# Patient Record
Sex: FEMALE | Race: BLACK OR AFRICAN AMERICAN | NOT HISPANIC OR LATINO | URBAN - METROPOLITAN AREA
[De-identification: names, ages, dates, MRNs, and addresses within clinical notes are randomized per-mention and may not be internally consistent; named-entity substitution may affect disease eponyms.]

---

## 2022-06-10 ENCOUNTER — INPATIENT (INPATIENT)
Facility: HOSPITAL | Age: 47
LOS: 1 days | Discharge: HOME | DRG: 198 | End: 2022-06-12
Attending: FAMILY MEDICINE | Admitting: INTERNAL MEDICINE
Payer: MEDICAID

## 2022-06-10 VITALS — WEIGHT: 145.06 LBS | HEIGHT: 62 IN

## 2022-06-10 DIAGNOSIS — I20.0 UNSTABLE ANGINA: ICD-10-CM

## 2022-06-10 LAB
ALBUMIN SERPL ELPH-MCNC: 3.2 G/DL — LOW (ref 3.3–5)
ALP SERPL-CCNC: 42 U/L — SIGNIFICANT CHANGE UP (ref 40–120)
ALT FLD-CCNC: 18 U/L — SIGNIFICANT CHANGE UP (ref 12–78)
ANION GAP SERPL CALC-SCNC: 5 MMOL/L — SIGNIFICANT CHANGE UP (ref 5–17)
AST SERPL-CCNC: 18 U/L — SIGNIFICANT CHANGE UP (ref 15–37)
BASOPHILS # BLD AUTO: 0.02 K/UL — SIGNIFICANT CHANGE UP (ref 0–0.2)
BASOPHILS NFR BLD AUTO: 0.3 % — SIGNIFICANT CHANGE UP (ref 0–2)
BILIRUB SERPL-MCNC: 0.3 MG/DL — SIGNIFICANT CHANGE UP (ref 0.2–1.2)
BUN SERPL-MCNC: 24 MG/DL — HIGH (ref 7–23)
CALCIUM SERPL-MCNC: 9.1 MG/DL — SIGNIFICANT CHANGE UP (ref 8.5–10.1)
CHLORIDE SERPL-SCNC: 107 MMOL/L — SIGNIFICANT CHANGE UP (ref 96–108)
CO2 SERPL-SCNC: 24 MMOL/L — SIGNIFICANT CHANGE UP (ref 22–31)
CREAT SERPL-MCNC: 1.2 MG/DL — SIGNIFICANT CHANGE UP (ref 0.5–1.3)
EGFR: 56 ML/MIN/1.73M2 — LOW
EOSINOPHIL # BLD AUTO: 0.18 K/UL — SIGNIFICANT CHANGE UP (ref 0–0.5)
EOSINOPHIL NFR BLD AUTO: 2.5 % — SIGNIFICANT CHANGE UP (ref 0–6)
GLUCOSE SERPL-MCNC: 87 MG/DL — SIGNIFICANT CHANGE UP (ref 70–99)
HCT VFR BLD CALC: 34.1 % — LOW (ref 34.5–45)
HGB BLD-MCNC: 11 G/DL — LOW (ref 11.5–15.5)
IMM GRANULOCYTES NFR BLD AUTO: 0.1 % — SIGNIFICANT CHANGE UP (ref 0–1.5)
LYMPHOCYTES # BLD AUTO: 1.67 K/UL — SIGNIFICANT CHANGE UP (ref 1–3.3)
LYMPHOCYTES # BLD AUTO: 23 % — SIGNIFICANT CHANGE UP (ref 13–44)
MCHC RBC-ENTMCNC: 27.2 PG — SIGNIFICANT CHANGE UP (ref 27–34)
MCHC RBC-ENTMCNC: 32.3 GM/DL — SIGNIFICANT CHANGE UP (ref 32–36)
MCV RBC AUTO: 84.2 FL — SIGNIFICANT CHANGE UP (ref 80–100)
MONOCYTES # BLD AUTO: 0.67 K/UL — SIGNIFICANT CHANGE UP (ref 0–0.9)
MONOCYTES NFR BLD AUTO: 9.2 % — SIGNIFICANT CHANGE UP (ref 2–14)
NEUTROPHILS # BLD AUTO: 4.7 K/UL — SIGNIFICANT CHANGE UP (ref 1.8–7.4)
NEUTROPHILS NFR BLD AUTO: 64.9 % — SIGNIFICANT CHANGE UP (ref 43–77)
NT-PROBNP SERPL-SCNC: 620 PG/ML — HIGH (ref 0–125)
PLATELET # BLD AUTO: 371 K/UL — SIGNIFICANT CHANGE UP (ref 150–400)
POTASSIUM SERPL-MCNC: 4.3 MMOL/L — SIGNIFICANT CHANGE UP (ref 3.5–5.3)
POTASSIUM SERPL-SCNC: 4.3 MMOL/L — SIGNIFICANT CHANGE UP (ref 3.5–5.3)
PROT SERPL-MCNC: 7.8 GM/DL — SIGNIFICANT CHANGE UP (ref 6–8.3)
RBC # BLD: 4.05 M/UL — SIGNIFICANT CHANGE UP (ref 3.8–5.2)
RBC # FLD: 23.1 % — HIGH (ref 10.3–14.5)
SODIUM SERPL-SCNC: 136 MMOL/L — SIGNIFICANT CHANGE UP (ref 135–145)
TROPONIN I, HIGH SENSITIVITY RESULT: 8.77 NG/L — SIGNIFICANT CHANGE UP
TROPONIN I, HIGH SENSITIVITY RESULT: 8.92 NG/L — SIGNIFICANT CHANGE UP
TROPONIN I, HIGH SENSITIVITY RESULT: 8.96 NG/L — SIGNIFICANT CHANGE UP
WBC # BLD: 7.25 K/UL — SIGNIFICANT CHANGE UP (ref 3.8–10.5)
WBC # FLD AUTO: 7.25 K/UL — SIGNIFICANT CHANGE UP (ref 3.8–10.5)

## 2022-06-10 PROCEDURE — 84443 ASSAY THYROID STIM HORMONE: CPT

## 2022-06-10 PROCEDURE — 82607 VITAMIN B-12: CPT

## 2022-06-10 PROCEDURE — 83735 ASSAY OF MAGNESIUM: CPT

## 2022-06-10 PROCEDURE — 85652 RBC SED RATE AUTOMATED: CPT

## 2022-06-10 PROCEDURE — 80061 LIPID PANEL: CPT

## 2022-06-10 PROCEDURE — 83036 HEMOGLOBIN GLYCOSYLATED A1C: CPT

## 2022-06-10 PROCEDURE — 93306 TTE W/DOPPLER COMPLETE: CPT

## 2022-06-10 PROCEDURE — 84484 ASSAY OF TROPONIN QUANT: CPT

## 2022-06-10 PROCEDURE — 93926 LOWER EXTREMITY STUDY: CPT | Mod: 26,LT

## 2022-06-10 PROCEDURE — 82746 ASSAY OF FOLIC ACID SERUM: CPT

## 2022-06-10 PROCEDURE — 71045 X-RAY EXAM CHEST 1 VIEW: CPT | Mod: 26

## 2022-06-10 PROCEDURE — 99223 1ST HOSP IP/OBS HIGH 75: CPT

## 2022-06-10 PROCEDURE — 82550 ASSAY OF CK (CPK): CPT

## 2022-06-10 PROCEDURE — 81001 URINALYSIS AUTO W/SCOPE: CPT

## 2022-06-10 PROCEDURE — 93306 TTE W/DOPPLER COMPLETE: CPT | Mod: 26

## 2022-06-10 PROCEDURE — 86140 C-REACTIVE PROTEIN: CPT

## 2022-06-10 PROCEDURE — 99285 EMERGENCY DEPT VISIT HI MDM: CPT

## 2022-06-10 PROCEDURE — 93926 LOWER EXTREMITY STUDY: CPT | Mod: LT

## 2022-06-10 PROCEDURE — 84100 ASSAY OF PHOSPHORUS: CPT

## 2022-06-10 PROCEDURE — 36415 COLL VENOUS BLD VENIPUNCTURE: CPT

## 2022-06-10 PROCEDURE — 82306 VITAMIN D 25 HYDROXY: CPT

## 2022-06-10 PROCEDURE — 85025 COMPLETE CBC W/AUTO DIFF WBC: CPT

## 2022-06-10 PROCEDURE — 80053 COMPREHEN METABOLIC PANEL: CPT

## 2022-06-10 PROCEDURE — 93010 ELECTROCARDIOGRAM REPORT: CPT

## 2022-06-10 RX ORDER — BUPROPION HYDROCHLORIDE 150 MG/1
1 TABLET, EXTENDED RELEASE ORAL
Qty: 0 | Refills: 0 | DISCHARGE

## 2022-06-10 RX ORDER — ATORVASTATIN CALCIUM 80 MG/1
40 TABLET, FILM COATED ORAL AT BEDTIME
Refills: 0 | Status: DISCONTINUED | OUTPATIENT
Start: 2022-06-10 | End: 2022-06-12

## 2022-06-10 RX ORDER — ATORVASTATIN CALCIUM 80 MG/1
1 TABLET, FILM COATED ORAL
Qty: 0 | Refills: 0 | DISCHARGE

## 2022-06-10 RX ORDER — TRIAMTERENE/HYDROCHLOROTHIAZID 75 MG-50MG
1 TABLET ORAL
Qty: 0 | Refills: 0 | DISCHARGE

## 2022-06-10 RX ORDER — ALBUTEROL 90 UG/1
2 AEROSOL, METERED ORAL
Qty: 0 | Refills: 0 | DISCHARGE

## 2022-06-10 RX ORDER — PRASUGREL 5 MG/1
1 TABLET, FILM COATED ORAL
Qty: 0 | Refills: 0 | DISCHARGE

## 2022-06-10 RX ORDER — METOPROLOL TARTRATE 50 MG
1 TABLET ORAL
Qty: 0 | Refills: 0 | DISCHARGE

## 2022-06-10 RX ORDER — ACETAMINOPHEN 500 MG
1000 TABLET ORAL ONCE
Refills: 0 | Status: COMPLETED | OUTPATIENT
Start: 2022-06-10 | End: 2022-06-10

## 2022-06-10 RX ORDER — METOPROLOL TARTRATE 50 MG
25 TABLET ORAL
Refills: 0 | Status: DISCONTINUED | OUTPATIENT
Start: 2022-06-10 | End: 2022-06-12

## 2022-06-10 RX ORDER — FUROSEMIDE 40 MG
20 TABLET ORAL DAILY
Refills: 0 | Status: DISCONTINUED | OUTPATIENT
Start: 2022-06-10 | End: 2022-06-12

## 2022-06-10 RX ORDER — PRASUGREL 5 MG/1
10 TABLET, FILM COATED ORAL DAILY
Refills: 0 | Status: DISCONTINUED | OUTPATIENT
Start: 2022-06-11 | End: 2022-06-12

## 2022-06-10 RX ORDER — CITALOPRAM 10 MG/1
1 TABLET, FILM COATED ORAL
Qty: 0 | Refills: 0 | DISCHARGE

## 2022-06-10 RX ORDER — CITALOPRAM 10 MG/1
20 TABLET, FILM COATED ORAL DAILY
Refills: 0 | Status: DISCONTINUED | OUTPATIENT
Start: 2022-06-10 | End: 2022-06-12

## 2022-06-10 RX ORDER — DOXEPIN HCL 100 MG
50 CAPSULE ORAL AT BEDTIME
Refills: 0 | Status: DISCONTINUED | OUTPATIENT
Start: 2022-06-10 | End: 2022-06-12

## 2022-06-10 RX ORDER — ASPIRIN/CALCIUM CARB/MAGNESIUM 324 MG
81 TABLET ORAL DAILY
Refills: 0 | Status: DISCONTINUED | OUTPATIENT
Start: 2022-06-10 | End: 2022-06-12

## 2022-06-10 RX ORDER — BUPROPION HYDROCHLORIDE 150 MG/1
150 TABLET, EXTENDED RELEASE ORAL DAILY
Refills: 0 | Status: DISCONTINUED | OUTPATIENT
Start: 2022-06-10 | End: 2022-06-12

## 2022-06-10 RX ORDER — TRIAMTERENE/HYDROCHLOROTHIAZID 75 MG-50MG
1 TABLET ORAL DAILY
Refills: 0 | Status: DISCONTINUED | OUTPATIENT
Start: 2022-06-10 | End: 2022-06-12

## 2022-06-10 RX ORDER — ACETAMINOPHEN 500 MG
650 TABLET ORAL EVERY 6 HOURS
Refills: 0 | Status: DISCONTINUED | OUTPATIENT
Start: 2022-06-10 | End: 2022-06-12

## 2022-06-10 RX ORDER — ALBUTEROL 90 UG/1
2 AEROSOL, METERED ORAL EVERY 6 HOURS
Refills: 0 | Status: DISCONTINUED | OUTPATIENT
Start: 2022-06-10 | End: 2022-06-12

## 2022-06-10 RX ORDER — KETOROLAC TROMETHAMINE 30 MG/ML
30 SYRINGE (ML) INJECTION ONCE
Refills: 0 | Status: DISCONTINUED | OUTPATIENT
Start: 2022-06-10 | End: 2022-06-10

## 2022-06-10 RX ORDER — ASPIRIN/CALCIUM CARB/MAGNESIUM 324 MG
1 TABLET ORAL
Qty: 0 | Refills: 0 | DISCHARGE

## 2022-06-10 RX ORDER — NICOTINE POLACRILEX 2 MG
1 GUM BUCCAL DAILY
Refills: 0 | Status: DISCONTINUED | OUTPATIENT
Start: 2022-06-10 | End: 2022-06-12

## 2022-06-10 RX ORDER — DOXEPIN HCL 100 MG
1 CAPSULE ORAL
Qty: 0 | Refills: 0 | DISCHARGE

## 2022-06-10 RX ADMIN — Medication 20 MILLIGRAM(S): at 21:44

## 2022-06-10 RX ADMIN — Medication 400 MILLIGRAM(S): at 14:46

## 2022-06-10 RX ADMIN — Medication 1 PATCH: at 21:42

## 2022-06-10 RX ADMIN — Medication 30 MILLIGRAM(S): at 14:57

## 2022-06-10 RX ADMIN — Medication 50 MILLIGRAM(S): at 21:43

## 2022-06-10 RX ADMIN — ATORVASTATIN CALCIUM 40 MILLIGRAM(S): 80 TABLET, FILM COATED ORAL at 21:44

## 2022-06-10 RX ADMIN — BUPROPION HYDROCHLORIDE 150 MILLIGRAM(S): 150 TABLET, EXTENDED RELEASE ORAL at 21:43

## 2022-06-10 NOTE — ED PROVIDER NOTE - PROGRESS NOTE DETAILS
CHARLETTE Gaytan MD:  case d/w Dr. KENNY Rivers: advised TTE to be done & advises cath lab eval.  Cath lab informed, NP to come down to evaluate pt. CHARLETTE Gaytan MD:  Cath lab NP at bedside. CHARLETTE Gaytan MD:  Unable to enter CXR wet read: AYLEEN.  Paging cardio on call. CHARLETTE Gaytan MD:  Pt at [facility].  No furhter word from Cath lab re: further management.  Dr. JO dias aware of tele admission. CHARLETTE Gaytan MD:  Pt at [facility].  No further word from Cath lab re: further management.  Dr. JO dias aware of tele admission.

## 2022-06-10 NOTE — ED PROVIDER NOTE - GASTROINTESTINAL NEGATIVE STATEMENT, MLM
no abdominal pain, no bloating, no constipation, no diarrhea, no nausea and no vomiting.  + L groin painful swelling s/p prior cath

## 2022-06-10 NOTE — PHARMACOTHERAPY INTERVENTION NOTE - COMMENTS
Medication history complete, reviewed medication with patient and list provided by patient and confirmed with DrFirst.  Patients list states Metoprolol Tartrate but patient is actually taking Metoprolol Succinate (pt has bottle with her)

## 2022-06-10 NOTE — H&P ADULT - ASSESSMENT
47 year old female w hx CAD s/p 5 stents 1 month ago, hx gastric bypass, HTN presented to ED c/o chest pain    #Chest pain poss ACS  #CAD s/p 5 stents 4 weeks ago  1. admit to telemetry  2. serial trop  3. cardiology consulted  4. ECHO pending  5. continue home meds  ASA 81 mg  prasugel 10 mg\atorvastatin 40 mg  metoprolol 25 mg BID w parameters      #mildly elevated BNP 600s  1. daily weights  2. continue lasix 20 mg qd  3. EF pending      #L groin pain  due to hematoma from prior cath  1. warm compress  2. ambulas tolerated      #L foot paresthesia  s/p cath  1. ambulate w cane        #PSYCH   anxiety and depression  1. continue wellbutrin  2. doxepin         #MISC  1. SW re shelter placement on discharge    #HTN  1. beta blocker as above  2. triamterine/HCTZ  3. lasix      #Hx gastric bypass  stable      #Headache  1. 1. acetaminophen 1000 mg IV 47 year old female w hx CAD s/p 5 stents 1 month ago, hx gastric bypass, HTN presented to ED c/o chest pain    #Chest pain poss ACS  #CAD s/p 5 stents 4 weeks ago  1. admit to telemetry  2. serial trop  3. cardiology consulted  4. ECHO pending  5. continue home meds  ASA 81 mg  prasugel 10 mg\atorvastatin 40 mg  metoprolol 25 mg BID w parameters      #mildly elevated BNP 600s  1. daily weights  2. continue lasix 20 mg qd  3. EF pending      #L groin pain  due to hematoma from prior cath  1. warm compress  2. ambulas tolerated      #L foot paresthesia  s/p cath  1. ambulate w cane      #HTN  1. beta blocker as above  2. triamterine/HCTZ  3. lasix      #Hx gastric bypass  stable      #Headache  1. 1. acetaminophen 1000 mg IV        #PSYCH   anxiety and depression  1. continue wellbutrin  2. doxepin 50 mg q HS for insomnia        #Tobacco use  has cut down over past month from 1 ppd to 1-2 cig qd  1. nicotine patch    #MISC  1.  re shelter placement on discharge      #VTE  low risk

## 2022-06-10 NOTE — ED ADULT NURSE NOTE - OBJECTIVE STATEMENT
Pt reports that she is experiencing intermittent episodes of pain with no discernable trigger. Pt reports pain does worsen with movement.  Pt denies SOB.  Pt reports recent MI with multiple stents placed. Pt reports continued fatigue since MI.

## 2022-06-10 NOTE — PHARMACOTHERAPY INTERVENTION NOTE - COMMENTS
Medication history complete, reviewed medication with list provided by patient and confirmed with patient.

## 2022-06-10 NOTE — CONSULT NOTE ADULT - SUBJECTIVE AND OBJECTIVE BOX
Patient is a 47y old  Female who presents with a chief complaint of     HPI:      PAST MEDICAL & SURGICAL HISTORY:  CAD (coronary artery disease)          PREVIOUS CARDIAC WORKUP:      Echo:  Stress Test:  Cardiac Cath:    ALLERGIES:    penicillin (Unknown)       MEDICATIONS  (STANDING):    MEDICATIONS  (PRN):      FAMILY HISTORY:        SOCIAL HISTORY:  .scl     ROS:     .ros    Vital Signs Last 24 Hrs  T(C): 36.8 (10 Kang 2022 10:44), Max: 37.2 (10 Kang 2022 08:39)  T(F): 98.3 (10 Kang 2022 10:44), Max: 98.9 (10 Kang 2022 08:39)  HR: 83 (10 Kang 2022 10:44) (83 - 90)  BP: 107/73 (10 Kang 2022 10:44) (102/68 - 107/73)  BP(mean): 80 (10 Kang 2022 10:44) (78 - 80)  RR: 18 (10 Kang 2022 10:44) (18 - 18)  SpO2: 99% (10 Kang 2022 10:44) (98% - 99%)    I&O's Summary      PHYSICAL EXAM:    .phy      TELEMETRY:    ECG:    LABS:                          11.0   7.25  )-----------( 371      ( 10 Kang 2022 09:03 )             34.1     06-10    136  |  107  |  24<H>  ----------------------------<  87  4.3   |  24  |  1.20    Ca    9.1      10 Kang 2022 09:03    TPro  7.8  /  Alb  3.2<L>  /  TBili  0.3  /  DBili  x   /  AST  18  /  ALT  18  /  AlkPhos  42  06-10        Pro BNP  620 06-10 @ 09:03  D Dimer  -- 06-10 @ 09:03              RADIOLOGY & ADDITIONAL STUDIES:     Patient is a 47y old  Female who presents with a chief complaint of     HPI: 46 y/o  AA F with PMHX of MI 4 weeks with 5 stents c/b left groin hematoma. Presented to ED c/o CP radiating to neck x 2 days. Reports pain is located under left breast and mid sternum, relief of pain when lays flat or back. Patient noticed discomfort when performing ADLs. C/o left groin pain and limited mobility r/t hematoma. Patient reporting she received 2 units of blood after her stents. Patient recently relocated to [place] from [place] with 2 out of 3 of her children. Complaint with ASA and Prasugrel and metoprolol. Current smoker; cutting back since recent MI        PAST MEDICAL & SURGICAL HISTORY:  CAD (coronary artery disease)      PREVIOUS CARDIAC WORKUP:      Echo:  Stress Test:  Cardiac Cath: May 2022- [facility]     ALLERGIES:    penicillin (Unknown)       MEDICATIONS  (STANDING):    MEDICATIONS  (PRN):    FAMILY HISTORY: grandfather with CV disease       REVIEW OF SYSTEMS:    CONSTITUTIONAL: No fever, weight loss, chills, shakes, or fatigue  EYES: No eye pain, visual disturbances, or discharge  ENMT:  No difficulty hearing, tinnitus, vertigo; No sinus or throat pain  NECK: No pain or stiffness  RESPIRATORY: No cough, wheezing, hemoptysis, or shortness of breath  CARDIOVASCULAR: +  chest pain, dyspnea, palpitations, + left groin hematoma,  no dizziness, syncope, paroxysmal nocturnal dyspnea, orthopnea, or arm or leg swelling  GASTROINTESTINAL: No abdominal  or epigastric pain, nausea, vomiting, hematemesis, diarrhea, constipation, melena or bright red blood.  GENITOURINARY: No dysuria, nocturia, hematuria, or urinary incontinence  NEUROLOGICAL: No headaches, memory loss, slurred speech, limb weakness, loss of strength, numbness, or tremors  SKIN: No itching, burning, rashes, or lesions   MUSCULOSKELETAL: No joint pain or swelling, muscle, back, or extremity pain  PSYCHIATRIC: No depression, anxiety, or difficulty sleeping    Vital Signs Last 24 Hrs  T(C): 36.8 (10 Kang 2022 10:44), Max: 37.2 (10 Kang 2022 08:39)  T(F): 98.3 (10 Kang 2022 10:44), Max: 98.9 (10 Kang 2022 08:39)  HR: 83 (10 Kang 2022 10:44) (83 - 90)  BP: 107/73 (10 Kang 2022 10:44) (102/68 - 107/73)  BP(mean): 80 (10 Kang 2022 10:44) (78 - 80)  RR: 18 (10 Kang 2022 10:44) (18 - 18)  SpO2: 99% (10 Kang 2022 10:44) (98% - 99%)      PHYSICAL EXAM  GENERAL: NAD, AAOx3  CHEST/LUNG: Clear to auscultation bilaterally; No wheeze  HEART: s1 s2 Regular rate and rhythm; No murmurs, rubs, or gallops  ABDOMEN: Soft, Nontender, Nondistended; Bowel sounds present X 4 quadrants   EXTREMITIES:  2+ Peripheral Pulses, No clubbing, cyanosis, or edema  SKIN: No rashes or lesions,  b/l LE not red, cool to touch,  no open skin no drainage- left groin hematoma, soft skin not taut   NEURO: nonfocal CN/motor/sensory/reflexes  Psych: normal affect and behavior, calm and cooperative       TELEMETRY: SR on monitor     ECG:   · EKG Date/Time: 10-Kang-2022 08:31  · Rate: 93  · Interpretation: normal sinus rhythm  · Axis: Normal  · QRS: PRWP V1 & V2  · ST/T Wave: TW flat II & F  · Other Findings: no ectopy      LABS:                          11.0   7.25  )-----------( 371      ( 10 Kang 2022 09:03 )             34.1     06-10    136  |  107  |  24<H>  ----------------------------<  87  4.3   |  24  |  1.20    Ca    9.1      10 Kang 2022 09:03    TPro  7.8  /  Alb  3.2<L>  /  TBili  0.3  /  DBili  x   /  AST  18  /  ALT  18  /  AlkPhos  42  06-10      Pro BNP  620 06-10 @ 09:03  D Dimer  -- 06-10 @ 09:03    Troponin I, High Sensitivity Result: 8.77:       RADIOLOGY & ADDITIONAL STUDIES:    < from: US Duplex Arterial Lower Ext Ltd, Left (06.10.22 @ 11:09) >    IMPRESSION: No pseudoaneurysm visualized on this exam. Complex left groin   collection measuring 6.3 cm,likely hematoma. Clinical correlation and   follow-up is recommended.    < end of copied text >    < from: Xray Chest 1 View- PORTABLE-Urgent (06.10.22 @ 10:35) >  IMPRESSION: Clear lungs.    < end of copied text >    ECHO pending     47-year-old female admitted with complaints of chest discomfort for the last 2 days.  Pain is positional, worse with leaning forward and improves with lying back.  Radiating to the neck.  Nonexertional.  History of recent MI about 4  weeks ago with 5 stents placed.  Compliant to DAPT. Procedure was performed in [place].  Procedure complicated with left groin hematoma.  Initial access obtained through the right wrist, right groin were unsuccessful and left femoral approach was used.  Postprocedure patient had a large hematoma that was compressed.  She received 2 units of packed red blood cell transfusion.  Since procedure she has been having complaints of left groin pain and difficulty ambulating.          PAST MEDICAL & SURGICAL HISTORY:  CAD (coronary artery disease)  MI, PCI of unknown coronary artery      PREVIOUS CARDIAC WORKUP:    Cardiac Cath: May 2022- [facility]     ALLERGIES:    penicillin (Unknown)     Home Medications:   * Patient Currently Takes Medications as of 10-Kang-2022 12:25 documented in Structured Notes  · 	Aspir 81 oral delayed release tablet: Last Dose Taken: 10-Kang-2022 AM, 1 tab(s) orally once a day  · 	prasugrel 10 mg oral tablet: Last Dose Taken:  , 1 tab(s) orally once a day  · 	citalopram 20 mg oral tablet: Last Dose Taken:  , 1 tab(s) orally once a day  · 	Wellbutrin  mg/24 hours oral tablet, extended release: Last Dose Taken:  , 1 tab(s) orally every 24 hours  · 	furosemide 20 mg oral tablet: Last Dose Taken:  , 1 tab(s) orally once a day  · 	doxepin 50 mg oral capsule: Last Dose Taken:  , 1 cap(s) orally once a day (at bedtime)  · 	Albuterol (Eqv-ProAir HFA) 90 mcg/inh inhalation aerosol: Last Dose Taken:  , 2 puff(s) inhaled every 4 hours, As Needed - for shortness of breath and/or wheezing  · 	atorvastatin 40 mg oral tablet: Last Dose Taken:  , 1 tab(s) orally once a day (at bedtime)  · 	metoprolol succinate 25 mg oral tablet, extended release: Last Dose Taken:  , 1 tab(s) orally 2 times a day  · 	triamterene-hydrochlorothiazide 37.5 mg-25 mg oral tablet: 1 tab(s) orally once a day    FAMILY HISTORY: grandfather with CV disease       REVIEW OF SYSTEMS:    CONSTITUTIONAL: No fever, weight loss, chills, shakes, or fatigue  EYES: No eye pain, visual disturbances, or discharge  ENMT:  No difficulty hearing, tinnitus, vertigo; No sinus or throat pain  NECK: No pain or stiffness  RESPIRATORY: No cough, wheezing, hemoptysis, or shortness of breath  CARDIOVASCULAR: +  chest pain, dyspnea, palpitations, + left groin hematoma,  no dizziness, syncope, paroxysmal nocturnal dyspnea, orthopnea, or arm or leg swelling  GASTROINTESTINAL: No abdominal  or epigastric pain, nausea, vomiting, hematemesis, diarrhea, constipation, melena or bright red blood.  GENITOURINARY: No dysuria, nocturia, hematuria, or urinary incontinence  NEUROLOGICAL: No headaches, memory loss, slurred speech, limb weakness, loss of strength, numbness, or tremors  SKIN: No itching, burning, rashes, or lesions   MUSCULOSKELETAL: No joint pain or swelling, muscle, back, or extremity pain  PSYCHIATRIC: No depression, anxiety, or difficulty sleeping      Vital Signs Last 24 Hrs  T(C): 36.8 (10 Kang 2022 10:44), Max: 37.2 (10 Kang 2022 08:39)  T(F): 98.3 (10 Kang 2022 10:44), Max: 98.9 (10 Kang 2022 08:39)  HR: 83 (10 Kang 2022 10:44) (83 - 90)  BP: 107/73 (10 Kang 2022 10:44) (102/68 - 107/73)  BP(mean): 80 (10 Kang 2022 10:44) (78 - 80)  RR: 18 (10 Kang 2022 10:44) (18 - 18)  SpO2: 99% (10 Kang 2022 10:44) (98% - 99%)      PHYSICAL EXAM  GENERAL: NAD, AAOx3  CHEST/LUNG: Clear to auscultation bilaterally; No wheeze  HEART: s1 s2 Regular rate and rhythm; No murmurs, rubs, or gallops  ABDOMEN: Soft, Nontender, Nondistended; Bowel sounds present X 4 quadrants   EXTREMITIES:  2+ Peripheral Pulses, No clubbing, cyanosis, or edema  SKIN: No rashes or lesions,  b/l LE not red, cool to touch,  no open skin no drainage- left groin hematoma, soft skin not taut   NEURO: nonfocal CN/motor/sensory/reflexes  Psych: normal affect and behavior, calm and cooperative       TELEMETRY: SR on monitor     ECG:   · EKG Date/Time: 10-Kang-2022 08:31  · Rate: 93  · Interpretation: normal sinus rhythm  · Axis: Normal  · QRS: PRWP V1 & V2  · ST/T Wave: TW flat II & F  · Other Findings: no ectopy      LABS:                          11.0   7.25  )-----------( 371      ( 10 Kang 2022 09:03 )             34.1     06-10    136  |  107  |  24<H>  ----------------------------<  87  4.3   |  24  |  1.20    Ca    9.1      10 Kang 2022 09:03    TPro  7.8  /  Alb  3.2<L>  /  TBili  0.3  /  DBili  x   /  AST  18  /  ALT  18  /  AlkPhos  42  06-10      Pro BNP  620 06-10 @ 09:03      Troponin I, High Sensitivity Result: 8.77:       RADIOLOGY & ADDITIONAL STUDIES:    < from: US Duplex Arterial Lower Ext Ltd, Left (06.10.22 @ 11:09) >    IMPRESSION: No pseudoaneurysm visualized on this exam. Complex left groin   collection measuring 6.3 cm, likely hematoma. Clinical correlation and   follow-up is recommended.      < from: Xray Chest 1 View- PORTABLE-Urgent (06.10.22 @ 10:35) >  IMPRESSION: Clear lungs.      ECHO pending

## 2022-06-10 NOTE — H&P ADULT - HISTORY OF PRESENT ILLNESS
47 year old female w hx CAD s/p 5 stents 1 month ago, hx gastric bypass presented to ED c/o chest pain          PAST MEDICAL HX  CAD coronary artery disease w stents x 5  Gastric bypass  HTN hypertension      PAST SURGICAL HX  Cardiac cath   Gastric bypass 2017      FAMILY HX  +CAD both grandparents  denied premature CAD  +HTN both grandparents        SOCIAL HX  Smoker  had smoked 1 ppd but since cath 1-2 cig qd  Will need shelter for her and her 2 children ages 15 and 9 47 year old female w hx CAD s/p 5 stents 1 month ago, hx gastric bypass, HTN presented to ED c/o chest pain    SInce her cath 4 weeks ago in [place] she has not felt well  c/o fatigue  + L groin pain w numbness to L foot after cath, now walking w cane  + SSCP w radiation to L neck intermittent x 2 days  Occurs w minimal exertion 6-7/10  Associated w +palpitations                      +diaphoresis "clammy"                      + nausea                      +SOB  not sure what makes pain better  activity makes it worse    Also c/o posterior headache, not sure if it is stress related    Took all meds this AM  NPO since early today    In ED /68   HR 90   RR 18   T 98.9  98% sat RA  trop neg x 1  EKG NSR no acute ST-T changes  cardiology consulted  echo performed  doppler L groin + hematoma          PAST MEDICAL HX  CAD coronary artery disease w stents x 5  Gastric bypass  HTN hypertension      PAST SURGICAL HX  Cardiac cath   Gastric bypass 2017      FAMILY HX  +CAD both grandparents  denied premature CAD  +HTN both grandparents        SOCIAL HX  Smoker  had smoked 1 ppd but since cath 1-2 cig qd  Will need shelter for her and her 2 children ages 15 and 9 47 year old female w hx CAD s/p 5 stents 1 month ago, hx gastric bypass, HTN presented to ED c/o chest pain    SInce her cath 4 weeks ago in [place] she has not felt well  c/o fatigue  + L groin pain w numbness to L foot after cath, now walking w cane  + SSCP w radiation to L neck intermittent x 2 days  Occurs w minimal exertion 6-7/10  Associated w +palpitations                      +diaphoresis "clammy"                      + nausea                      +SOB  not sure what makes pain better  activity makes it worse    Also c/o posterior headache, not sure if it is stress related    Took all meds this AM  NPO since early today    In ED /68   HR 90   RR 18   T 98.9  98% sat RA  trop neg x 1  EKG NSR no acute ST-T changes  cardiology consulted  echo performed  doppler L groin + hematoma          PAST MEDICAL HX  CAD coronary artery disease w stents x 5  Gastric bypass  HTN hypertension  HX transfusion after cath      PAST SURGICAL HX  Cardiac cath   Gastric bypass 2017      FAMILY HX  +CAD both grandparents  denied premature CAD  +HTN both grandparents        SOCIAL HX  Smoker  had smoked 1 ppd but since cath 1-2 cig qd  Will need shelter for her and her 2 children ages 15 and 9

## 2022-06-10 NOTE — CONSULT NOTE ADULT - ASSESSMENT
EKG and CE negative.   Positional chest pain - pleuritic  Right groin hematoma - s/p recent cardiac cath. EKG and CE negative.   Positional chest pain - pleuritic; toradol 30mg IVP x 1  ECHO pending   Left groin hematoma - s/p recent cardiac cath.   no urgent need for cardiac catheterization. Patient with no EKG changes, and negative troponin  will try to obtain records from [facility] Chest pain.  Pleuritic in nature.  Positional increase with leaning forward and relief with laying back.  Likely post infarct or post cardiac injury pericarditis.  CAD, recent infarct and coronary stents 5 weeks ago.  Left groin hematoma after recent cardiac cath.  No clinical evidence of pseudoaneurysm.  No vascular compromise.  No neuro compromise.  Mild paresthesia in the left leg.  Walking limited because of left groin pain.  No neurological deficit.  Smoking.    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  Trial of nonsteroidal anti-inflammatory drugs.  Colchicine.    Follow-up cardiac enzymes.  Follow-up repeat EKG.  Echo to assess LV function, pericardium  Obtain records from [place].  Multiple vascular access.  Cardiac catheterization was first attempted from right radial, then right common femoral and then left common femoral artery.  Now has a hematoma.  For now conservative treatment for left groin hematoma.  Area is soft, compressible, no bruit.  Ultrasound is negative for pseudoaneurysm.  No vascular or neurological compromise noted.  Warm compresses advised.  Follow-up in 1 to 2 weeks.     Chest pain.  Pleuritic in nature.  Positional increase with leaning forward and relief with laying back.  Likely post infarct or post cardiac injury pericarditis.  CAD, recent infarct and coronary stents 5 weeks ago.  Left groin hematoma after recent cardiac cath.  No clinical evidence of pseudoaneurysm.  No vascular compromise.  No neuro compromise.  Mild paresthesia in the left leg.  Walking limited because of left groin pain.  No neurological deficit.  Smoking.    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  Trial of nonsteroidal anti-inflammatory drugs.  Colchicine.    Follow-up cardiac enzymes.  Follow-up repeat EKG.  Echo to assess LV function, pericardium  Obtain records from [place].  Multiple vascular access.  Cardiac catheterization was first attempted from right radial, then right common femoral and then left common femoral artery.  Now has a hematoma.  For now conservative treatment for left groin hematoma.  Area is soft, compressible, no bruit.  Ultrasound is negative for pseudoaneurysm.  No vascular or neurological compromise noted.  Warm compresses advised.  Follow-up in 1 to 2 weeks.  Unsure why on Lasix and Dyazide - will follow BP. Renal function and lytes are normal.

## 2022-06-10 NOTE — ED PROVIDER NOTE - GASTROINTESTINAL, MLM
Abdomen soft, non-tender, no guarding.  BS+.   L groin: + mild focal sts, nonpulsatile, + mildly TTP Abdomen soft, non-tender, no guarding.  BS+.   L groin: + moderate focal sts, nonpulsatile, + mildly TTP

## 2022-06-10 NOTE — ED PROVIDER NOTE - CLINICAL SUMMARY MEDICAL DECISION MAKING FREE TEXT BOX
46 yo AA F, s/p reported 5 cor. stents 1 mo. ago in [place] currently on ASA-81 & Prasugrel, metoprolol, ambulatory to ED c/o'ing 2 days intermittent chest pain.  L anterior chest pain, aching/pressure, mild - moderate severity, non-pleuritic, intermittent, + exacerbated by exertion, + radiating up into L ant. neck.  Associated palps., SOB, mild diaphoresis.  Plan: EKG, CXR, cardiac labs, d/w cardio/cath Lab, monitor/observe/reassess. 46 yo AA F, s/p reported 5 cor. stents 1 mo. ago in [facility]. currently on ASA-81 & Prasugrel, metoprolol, ambulatory to ED c/o'ing 2 days intermittent chest pain.  L anterior chest pain, aching/pressure, mild - moderate severity, non-pleuritic, intermittent, + exacerbated by exertion & leaning forwards, + radiating up into L ant. neck.  Associated palps., SOB, mild diaphoresis.  Plan: EKG, CXR, cardiac labs, d/w cardio/cath Lab, monitor/observe/reassess.  Tele admission.

## 2022-06-10 NOTE — ED PROVIDER NOTE - NSICDXPASTMEDICALHX_GEN_ALL_CORE_FT
PAST MEDICAL HISTORY:  CAD (coronary artery disease)      PAST MEDICAL HISTORY:  CAD (coronary artery disease) asthma

## 2022-06-10 NOTE — ED PROVIDER NOTE - MUSCULOSKELETAL, MLM
Spine appears normal, range of motion is not limited, no muscle or joint tenderness.  CUADRA x 4, no focal extermity swelling/tender. Spine appears normal, range of motion is not limited, no muscle or joint tenderness.  CUADRA x 4, no focal extremity swelling/tender.

## 2022-06-10 NOTE — H&P ADULT - NSVTERISKREFERASSESS_GEN_ALL_CORE
Problem: Pressure Injury Actual  Goal: # No deterioration in pressure injury (PI)  Outcome: Outcome Not Met, Continue to Monitor  Goal: # Verbalizes pressure injury management  Description: Document education using the patient education activity.  Outcome: Outcome Not Met, Continue to Monitor     
Refer to the Assessment tab to view/cancel completed assessment.

## 2022-06-10 NOTE — ED ADULT TRIAGE NOTE - CHIEF COMPLAINT QUOTE
PT C/O LEFT SIDED CHEST RADIATING TO NECK, PT HX OF 5 CARDIAC STENTS X 1 MONTH.  REQUESTED EKG UPON ARRIVAL TO ED

## 2022-06-10 NOTE — ED PROVIDER NOTE - CONSTITUTIONAL, MLM
normal... AA F, awake, alert, oriented to person, place, time/situation, mildly ill-appearing.  No respiratory distress.

## 2022-06-10 NOTE — ED ADULT NURSE NOTE - NSIMPLEMENTINTERV_GEN_ALL_ED
2
Implemented All Fall with Harm Risk Interventions:  [place] to call system. Call bell, personal items and telephone within reach. Instruct patient to call for assistance. Room bathroom lighting operational. Non-slip footwear when patient is off stretcher. Physically safe environment: no spills, clutter or unnecessary equipment. Stretcher in lowest position, wheels locked, appropriate side rails in place. Provide visual cue, wrist band, yellow gown, etc. Monitor gait and stability. Monitor for mental status changes and reorient to person, place, and time. Review medications for side effects contributing to fall risk. Reinforce activity limits and safety measures with patient and family. Provide visual clues: red socks.

## 2022-06-10 NOTE — ED PROVIDER NOTE - OBJECTIVE STATEMENT
48 yo AA F, s/p reported 5 cor. stents 1 mo[place] ago in [place] currently on ASA-81 & Prasugrel, metoprolol, ambualtory to ED c/o'ing 2 days intermittent chest pain.  L anterior chest pain, aching/pressure, mild - moderate severity, non-pleuritic, intermittent, + exacerbated by exertion, + radiating up into L ant. neck.  Associated palps., SOB, mild diaphoresis.  No F/C.  Pt also c/o's persistent L groin aching discomfort with associated swelling since the cardiac cath. 46 yo AA F, s/p reported 5 cor. stents 1 mo[place] ago in [place] currently on ASA-81 & Prasugrel, metoprolol, ambulatory to ED c/o'ing 2 days intermittent chest pain.  L anterior chest pain, aching/pressure, mild - moderate severity, non-pleuritic, intermittent, + exacerbated by exertion & by leaning forwards, + radiating up into L ant. neck.  Associated palps., SOB, mild diaphoresis.  No F/C.  Pt also c/o's persistent L groin aching discomfort with associated swelling since the cardiac cath.

## 2022-06-10 NOTE — PATIENT PROFILE ADULT - FALL HARM RISK - HARM RISK INTERVENTIONS

## 2022-06-10 NOTE — ED ADULT NURSE REASSESSMENT NOTE - NS ED NURSE REASSESS COMMENT FT1
received at approximately 1020, alert and oriented x 4, no acute respiratory distress, respirations even and unlabored, transported to [facility] and returned to ER safely, grandmother at bedside, c/o left groin pain, medicated for pain as tolerated, tolerating PO fluids well.

## 2022-06-10 NOTE — H&P ADULT - NSHPPHYSICALEXAM_GEN_ALL_CORE
Vital Signs Last 24 Hrs  T(C): 36.8 (10 Kang 2022 10:44), Max: 37.2 (10 Kang 2022 08:39)  T(F): 98.3 (10 Kang 2022 10:44), Max: 98.9 (10 Kang 2022 08:39)  HR: 83 (10 Kang 2022 10:44) (83 - 90)  BP: 107/73 (10 Kang 2022 10:44) (102/68 - 107/73)  BP(mean): 80 (10 Kang 2022 10:44) (78 - 80)  RR: 18 (10 Kang 2022 10:44) (18 - 18)  SpO2: 99% (10 Kang 2022 10:44) (98% - 99%)

## 2022-06-11 LAB
A1C WITH ESTIMATED AVERAGE GLUCOSE RESULT: 5.4 % — SIGNIFICANT CHANGE UP (ref 4–5.6)
APPEARANCE UR: ABNORMAL
BILIRUB UR-MCNC: NEGATIVE — SIGNIFICANT CHANGE UP
CHOLEST SERPL-MCNC: 128 MG/DL — SIGNIFICANT CHANGE UP
COLOR SPEC: YELLOW — SIGNIFICANT CHANGE UP
DIFF PNL FLD: NEGATIVE — SIGNIFICANT CHANGE UP
ESTIMATED AVERAGE GLUCOSE: 108 MG/DL — SIGNIFICANT CHANGE UP (ref 68–114)
GLUCOSE UR QL: NEGATIVE — SIGNIFICANT CHANGE UP
HDLC SERPL-MCNC: 40 MG/DL — LOW
KETONES UR-MCNC: NEGATIVE — SIGNIFICANT CHANGE UP
LEUKOCYTE ESTERASE UR-ACNC: NEGATIVE — SIGNIFICANT CHANGE UP
LIPID PNL WITH DIRECT LDL SERPL: 70 MG/DL — SIGNIFICANT CHANGE UP
NITRITE UR-MCNC: NEGATIVE — SIGNIFICANT CHANGE UP
NON HDL CHOLESTEROL: 87 MG/DL — SIGNIFICANT CHANGE UP
PH UR: 6 — SIGNIFICANT CHANGE UP (ref 5–8)
PROT UR-MCNC: NEGATIVE — SIGNIFICANT CHANGE UP
SP GR SPEC: 1.01 — SIGNIFICANT CHANGE UP (ref 1.01–1.02)
TRIGL SERPL-MCNC: 88 MG/DL — SIGNIFICANT CHANGE UP
UROBILINOGEN FLD QL: NEGATIVE — SIGNIFICANT CHANGE UP
VIT B12 SERPL-MCNC: 786 PG/ML — SIGNIFICANT CHANGE UP (ref 232–1245)

## 2022-06-11 PROCEDURE — 99232 SBSQ HOSP IP/OBS MODERATE 35: CPT

## 2022-06-11 RX ORDER — COLCHICINE 0.6 MG
0.6 TABLET ORAL
Refills: 0 | Status: DISCONTINUED | OUTPATIENT
Start: 2022-06-11 | End: 2022-06-12

## 2022-06-11 RX ORDER — IBUPROFEN 200 MG
400 TABLET ORAL THREE TIMES A DAY
Refills: 0 | Status: DISCONTINUED | OUTPATIENT
Start: 2022-06-11 | End: 2022-06-12

## 2022-06-11 RX ADMIN — Medication 400 MILLIGRAM(S): at 15:00

## 2022-06-11 RX ADMIN — Medication 50 MILLIGRAM(S): at 21:09

## 2022-06-11 RX ADMIN — Medication 400 MILLIGRAM(S): at 21:08

## 2022-06-11 RX ADMIN — Medication 81 MILLIGRAM(S): at 09:39

## 2022-06-11 RX ADMIN — CITALOPRAM 20 MILLIGRAM(S): 10 TABLET, FILM COATED ORAL at 09:40

## 2022-06-11 RX ADMIN — BUPROPION HYDROCHLORIDE 150 MILLIGRAM(S): 150 TABLET, EXTENDED RELEASE ORAL at 09:39

## 2022-06-11 RX ADMIN — Medication 20 MILLIGRAM(S): at 09:41

## 2022-06-11 RX ADMIN — Medication 400 MILLIGRAM(S): at 14:34

## 2022-06-11 RX ADMIN — Medication 0.6 MILLIGRAM(S): at 21:09

## 2022-06-11 RX ADMIN — Medication 1 TABLET(S): at 09:40

## 2022-06-11 RX ADMIN — ATORVASTATIN CALCIUM 40 MILLIGRAM(S): 80 TABLET, FILM COATED ORAL at 21:11

## 2022-06-11 RX ADMIN — Medication 1 PATCH: at 09:40

## 2022-06-11 RX ADMIN — PRASUGREL 10 MILLIGRAM(S): 5 TABLET, FILM COATED ORAL at 09:40

## 2022-06-11 RX ADMIN — Medication 0.6 MILLIGRAM(S): at 11:30

## 2022-06-11 NOTE — PROGRESS NOTE ADULT - SUBJECTIVE AND OBJECTIVE BOX
CURRENT CARDIAC WORKUP:       Echo:  Stress Test:  Cardiac Cath:    Allergies:   penicillin (Unknown)      MEDICATIONS  (STANDING):  aspirin enteric coated 81 milliGRAM(s) Oral daily  atorvastatin 40 milliGRAM(s) Oral at bedtime  buPROPion XL (24-Hour) . 150 milliGRAM(s) Oral daily  citalopram 20 milliGRAM(s) Oral daily  doxepin 50 milliGRAM(s) Oral at bedtime  furosemide    Tablet 20 milliGRAM(s) Oral daily  metoprolol succinate ER 25 milliGRAM(s) Oral two times a day  nicotine -   7 mG/24Hr(s) Patch 1 patch Transdermal daily  prasugrel 10 milliGRAM(s) Oral daily  triamterene 37.5 mG/hydrochlorothiazide 25 mG Tablet 1 Tablet(s) Oral daily    MEDICATIONS  (PRN):  acetaminophen     Tablet .. 650 milliGRAM(s) Oral every 6 hours PRN Temp greater or equal to 38C (100.4F), Mild Pain (1 - 3)  ALBUTerol    90 MICROgram(s) HFA Inhaler 2 Puff(s) Inhalation every 6 hours PRN Shortness of Breath      ROS:     .ros      Vital Signs Last 24 Hrs  T(C): 36.8 (10 Kang 2022 16:08), Max: 37.2 (10 Kang 2022 08:39)  T(F): 98.3 (10 Kang 2022 16:08), Max: 98.9 (10 Kang 2022 08:39)  HR: 85 (10 Kang 2022 21:39) (79 - 90)  BP: 97/51 (10 Kang 2022 21:39) (96/56 - 107/73)  BP(mean): 63 (10 Kang 2022 14:54) (63 - 80)  RR: 19 (10 Kang 2022 14:54) (18 - 19)  SpO2: 100% (10 Kang 2022 21:39) (98% - 100%)    I&O's Summary      PHYSICAL EXAM:    .phy      TELEMETRY:    ECG:    LABS:                        11.0   7.25  )-----------( 371      ( 10 Kang 2022 09:03 )             34.1     06-10    136  |  107  |  24<H>  ----------------------------<  87  4.3   |  24  |  1.20    Ca    9.1      10 Kang 2022 09:03    TPro  7.8  /  Alb  3.2<L>  /  TBili  0.3  /  DBili  x   /  AST  18  /  ALT  18  /  AlkPhos  42  06-10      HS trop-I  Negative X 3      Pro BNP  620 06-10 @ 09:03        RADIOLOGY & ADDITIONAL STUDIES:    < from: Xray Chest 1 View- PORTABLE-Urgent (06.10.22 @ 10:35) >  IMPRESSION: Clear lungs.    < from: US Duplex Arterial Lower Ext Ltd, Left (06.10.22 @ 11:09) >  IMPRESSION: No pseudoaneurysm visualized on this exam. Complex left groin   collection measuring 6.3 cm,likely hematoma. Clinical correlation and   follow-up is recommended.   47-year-old female admitted with complaints of chest discomfort for the last 2 days.  Pain is positional, worse with leaning forward and improves with lying back.  Radiating to the neck.  Nonexertional.  History of recent MI about 4  weeks ago with 5 stents placed.  Compliant to DAPT. Procedure was performed in [place].  Procedure complicated with left groin hematoma.  Initial access obtained through the right wrist, right groin were unsuccessful and left femoral approach was used.  Postprocedure patient had a large hematoma that was compressed.  She received 2 units of packed red blood cell transfusion.  Since procedure she has been having complaints of left groin pain and difficulty ambulating.    Today, feels better with trial of NSAIDs.      PAST MEDICAL & SURGICAL HISTORY:  CAD (coronary artery disease)  MI, PCI of unknown coronary artery      CARDIAC WORKUP:    Cardiac Cath: May 2022- [facility]  < from: TTE Echo Complete w/o Contrast w/ Doppler (06.10.22 @ 12:06) >   The basal septal wall is akinetic.   The basal inferior wall is akinetic. All remaining segments demonstrate normal thickening and contractility.   Estimated left ventricular ejection fraction is 55 %.   The left ventricular is normal in size and wall thickness.   Normal appearing left atrium.   Normal appearing right atrium.   Normal appearing right ventricle structure and function.   Normal aortic valve structure and function.   The mitral valve leaflets appear thin and normal.   Trace mitral regurgitation is present.   The tricuspid valve leaflets are thin and pliable; valve motion is normal.   Trace tricuspid valve regurgitation is present.   No evidence of pericardial effusion.   The IVC appears normal.      Allergies:   penicillin (Unknown)      MEDICATIONS  (STANDING):  aspirin enteric coated 81 milliGRAM(s) Oral daily  atorvastatin 40 milliGRAM(s) Oral at bedtime  buPROPion XL (24-Hour) . 150 milliGRAM(s) Oral daily  citalopram 20 milliGRAM(s) Oral daily  doxepin 50 milliGRAM(s) Oral at bedtime  furosemide    Tablet 20 milliGRAM(s) Oral daily  metoprolol succinate ER 25 milliGRAM(s) Oral two times a day  nicotine -   7 mG/24Hr(s) Patch 1 patch Transdermal daily  prasugrel 10 milliGRAM(s) Oral daily  triamterene 37.5 mG/hydrochlorothiazide 25 mG Tablet 1 Tablet(s) Oral daily    MEDICATIONS  (PRN):  acetaminophen     Tablet .. 650 milliGRAM(s) Oral every 6 hours PRN Temp greater or equal to 38C (100.4F), Mild Pain (1 - 3)  ALBUTerol    90 MICROgram(s) HFA Inhaler 2 Puff(s) Inhalation every 6 hours PRN Shortness of Breath      ROS:     Detailed ten system ROS was performed and was negative except for history as eluded to above.    no fever  no chills  no nausea  no vomiting  no diarrhea  no constipation  no melena  no hematochezia  + chest pain, improving  no palpitations  no sob at rest  no dyspnea on exertion  no cough  no wheezing  no anorexia  no headache  no dizziness  no syncope  no weakness  no myalgia  no dysuria  no polyuria  no hematuria       Vital Signs Last 24 Hrs  T(C): 36.8 (10 Kang 2022 16:08), Max: 37.2 (10 Kang 2022 08:39)  T(F): 98.3 (10 Kang 2022 16:08), Max: 98.9 (10 Kang 2022 08:39)  HR: 85 (10 Kang 2022 21:39) (79 - 90)  BP: 97/51 (10 Kang 2022 21:39) (96/56 - 107/73)  BP(mean): 63 (10 Kang 2022 14:54) (63 - 80)  RR: 19 (10 Kang 2022 14:54) (18 - 19)  SpO2: 100% (10 Kang 2022 21:39) (98% - 100%)      PHYSICAL EXAM:    General:                Comfortable, AAO X 3, in no distress.   HEENT:                  Atraumatic, PERRLA, EOMI, conjunctiva clear.   Neck:                     Supple, no adenopathy, no thyromegaly, no JVD, no bruit.  Chest:                    Clear, B/L symmetric air entry, no tachypnea  Heart:                     S1, S2 normal, + murmur, +rub  Abdomen:              Soft, non-tender, bowel sounds active. No palpable masses.  Extremities:           no cyanosis, no edema. Peripheral pulses normal. Left groin hematoma (6cm X 4cm) soft, no induration  Skin:                      Skin color, texture normal. No rashes.  Neurologic:            Grossly nonfocal.       TELEMETRY:    Normal sinus rhythm with no tachy or zaida events     ECG:   NSR, inferolateral infarct    LABS:                        11.0   7.25  )-----------( 371      ( 10 Kang 2022 09:03 )             34.1     06-10    136  |  107  |  24<H>  ----------------------------<  87  4.3   |  24  |  1.20    Ca    9.1      10 Kang 2022 09:03    TPro  7.8  /  Alb  3.2<L>  /  TBili  0.3  /  DBili  x   /  AST  18  /  ALT  18  /  AlkPhos  42  06-10      HS trop-I  Negative X 3      Pro BNP  620 06-10 @ 09:03        RADIOLOGY & ADDITIONAL STUDIES:    < from: Xray Chest 1 View- PORTABLE-Urgent (06.10.22 @ 10:35) >  IMPRESSION: Clear lungs.    < from: US Duplex Arterial Lower Ext Ltd, Left (06.10.22 @ 11:09) >  IMPRESSION: No pseudoaneurysm visualized on this exam. Complex left groin   collection measuring 6.3 cm, likely hematoma. Clinical correlation and   follow-up is recommended.

## 2022-06-11 NOTE — PROGRESS NOTE ADULT - ASSESSMENT
Pericarditis. Post MI Dressler's syndrome  CAD, s/p PCI 5 weeks ago for MI  Left groin hematoma    Suggest:    NSAIDs and colchicine  Discharge planning Pericarditis. Post MI Dressler's syndrome  CAD, s/p PCI 5 weeks ago for MI  Left groin hematoma  Smoking    Suggest:    NSAIDs and colchicine  Conservative treatment for left groin hematoma.  Area is soft, compressible, no bruit.  Ultrasound is negative for pseudoaneurysm.  No vascular or neurological compromise noted.  Warm compresses advised.  Follow-up in 1 to 2 weeks.  Discharge planning  Diet and lifestyle modifications suggested.  Increase exercise.  Smoking cessation is advised. Pt was educated about the risks of smoking. Pt was advised about the options available for smoking cessation.   I shall f/u PRN now.

## 2022-06-11 NOTE — PROGRESS NOTE ADULT - SUBJECTIVE AND OBJECTIVE BOX
Subjective:  Patient is a 47y old  Female who presents with a chief complaint of chest pain (2022 08:28)    HPI:  47 year old female w hx CAD s/p 5 stents 1 month ago, hx gastric bypass, HTN presented to ED c/o chest pain    SInce her cath 4 weeks ago in [place] she has not felt well  c/o fatigue  + L groin pain w numbness to L foot after cath, now walking w cane  + SSCP w radiation to L neck intermittent x 2 days  Occurs w minimal exertion 6-7/10  Associated w +palpitations                      +diaphoresis "clammy"                      + nausea                      +SOB  not sure what makes pain better  activity makes it worse    Also c/o posterior headache, not sure if it is stress related    Took all meds this AM  NPO since early today    In ED /68   HR 90   RR 18   T 98.9  98% sat RA  trop neg x 1  EKG NSR no acute ST-T changes  cardiology consulted  echo performed  doppler L groin + hematoma          PAST MEDICAL HX  CAD coronary artery disease w stents x 5  Gastric bypass  HTN hypertension  HX transfusion after cath      PAST SURGICAL HX  Cardiac cath   Gastric bypass       FAMILY HX  +CAD both grandparents  denied premature CAD  +HTN both grandparents        SOCIAL HX  Smoker  had smoked 1 ppd but since cath 1-2 cig qd  Will need shelter for her and her 2 children ages 15 and 9 (10 Kang 2022 13:02)         Patient seen and examined at bedside earlier today,     Review of system- Rest of the review of system are negative except mentioned in HPI    Objective: Vital sings reviewed for last 24 h  T(C): 36.8 (22 @ 20:31), Max: 36.8 (22 @ 15:46)  HR: 94 (22 @ 20:31) (85 - 96)  BP: 102/79 (22 @ 20:31) (95/69 - 102/79)  RR: 18 (22 @ 20:31) (18 - 18)  SpO2: 97% (22 @ 20:31) (97% - 100%)  Wt(kg): --  Daily     Daily Weight in k.2 (2022 06:17)  CAPILLARY BLOOD GLUCOSE          Physical exam:   General : NAD, appear to be of stated age , well groomed   NERVOUS SYSTEM:  Alert & Oriented X3, non- focal exam, Motor Strength 5/5 B/L upper and lower extremities; DTRs 2+ intact and symmetric  HEAD:  Atraumatic, Normocephalic  EYES: EOMI, PERRLA, conjunctiva and sclera clear  HEENT: Moist mucous membranes, Supple neck , No JVD  CHEST: Clear to auscultation bilaterally; No rales, no rhonchi, no wheezing  HEART: Regular rate and rhythm; No murmurs, no rubs or gallops  ABDOMEN: Soft, Non-tender, Non-distended; Bowel sounds present, no guarding , no peritoneal irritation   GENITOURINARY- Voiding, no suprapubic tenderness  EXTREMITIES:  2+ Peripheral Pulses, No clubbing, cyanosis,   edema  MUSCULOSKELETAL:- No muscle tenderness, Muscle tone normal, No joint tenderness, no Joint swelling,  Joint ROM –normal   SKIN-no rash, no lesion    LABS: all reviewed                        11.0   7.25  )-----------( 371      ( 10 Kang 2022 09:03 )             34.1     06-10    136  |  107  |  24<H>  ----------------------------<  87  4.3   |  24  |  1.20    Ca    9.1      10 Kang 2022 09:03    TPro  7.8  /  Alb  3.2<L>  /  TBili  0.3  /  DBili  x   /  AST  18  /  ALT  18  /  AlkPhos  42  06-10                  RECENT CULTURES:    RADIOLOGY & ADDITIONAL TESTS: all reviewed   EKG  reviewed   Current medications:  acetaminophen     Tablet .. 650 milliGRAM(s) Oral every 6 hours PRN  ALBUTerol    90 MICROgram(s) HFA Inhaler 2 Puff(s) Inhalation every 6 hours PRN  aspirin enteric coated 81 milliGRAM(s) Oral daily  atorvastatin 40 milliGRAM(s) Oral at bedtime  buPROPion XL (24-Hour) . 150 milliGRAM(s) Oral daily  citalopram 20 milliGRAM(s) Oral daily  colchicine 0.6 milliGRAM(s) Oral two times a day  doxepin 50 milliGRAM(s) Oral at bedtime  furosemide    Tablet 20 milliGRAM(s) Oral daily  ibuprofen  Tablet. 400 milliGRAM(s) Oral three times a day  metoprolol succinate ER 25 milliGRAM(s) Oral two times a day  nicotine -   7 mG/24Hr(s) Patch 1 patch Transdermal daily  prasugrel 10 milliGRAM(s) Oral daily  triamterene 37.5 mG/hydrochlorothiazide 25 mG Tablet 1 Tablet(s) Oral daily             Subjective:  Patient is a 47y old  Female who presents with a chief complaint of chest pain     HPI:  47 year old female w hx CAD s/p 5 stents 1 month ago, hx gastric bypass, HTN, nicotine dependence, lives in shelter with 2 kids admitted on 6/10/22 with  c/o chest pain.  SInce her cath 4 weeks ago in [place] she has not felt well , c/o fatigue. + L groin pain w numbness to L foot after cath, now walking w cane, + substernal CP w radiation to L neck intermittent x 2 days, Occurs w minimal exertion 6-7/10, Associated w +palpitations, +diaphoresis "clammy", + nausea, SOB, not sure what makes pain better activity makes it worse.  Also c/o posterior headache, not sure if it is stress related .   In ED /68   HR 90   RR 18   T 98.9  98% sat RA, trop neg x 1, EKG NSR no acute ST-T changes, cardiology consulted, echo performed, doppler L groin + hematoma     -   Patient seen and examined at bedside earlier today, reports no chest pain, left groin pain on ambulation persist, afebrile, dyspnea on exertion, denies headache, dizziness, palpitations    Review of system- Rest of the review of system are negative except mentioned in HPI    Objective: Vital sings reviewed for last 24 h  T(C): 36.8 (22 @ 20:31), Max: 36.8 (22 @ 15:46)  HR: 94 (22 @ 20:31) (85 - 96)  BP: 102/79 (22 @ 20:31) (95/69 - 102/79)  RR: 18 (22 @ 20:31) (18 - 18)  SpO2: 97% (22 @ 20:31) (97% - 100%)  Wt(kg): --  Daily     Daily Weight in k.2 (2022 06:17)  CAPILLARY BLOOD GLUCOSE    Physical exam:   General : NAD, appear to be of stated age , well groomed   NERVOUS SYSTEM:  Alert & Oriented X3, non- focal exam, Motor Strength 5/5 B/L upper and lower extremities; DTRs 2+ intact and symmetric  HEAD:  Atraumatic, Normocephalic  EYES: EOMI, PERRLA, conjunctiva and sclera clear  HEENT: Moist mucous membranes, Supple neck , No JVD  CHEST: Clear to auscultation bilaterally; No rales, no rhonchi, no wheezing  HEART: Regular rate and rhythm; No murmurs, no rubs or gallops  ABDOMEN: Soft, Non-tender, Non-distended; Bowel sounds present, no guarding , no peritoneal irritation , + left groin lump tender to touch  GENITOURINARY- Voiding, no suprapubic tenderness  EXTREMITIES:  2+ Peripheral Pulses, No clubbing, cyanosis,   edema  MUSCULOSKELETAL:- No muscle tenderness, Muscle tone normal, No joint tenderness, no Joint swelling,  Joint ROM –normal   SKIN-no rash, no lesion    LABS: all reviewed                        11.0   7.25  )-----------( 371      ( 10 Kang 2022 09:03 )             34.1     06-10    136  |  107  |  24<H>  ----------------------------<  87  4.3   |  24  |  1.20    Ca    9.1      10 Kang 2022 09:03    TPro  7.8  /  Alb  3.2<L>  /  TBili  0.3  /  DBili  x   /  AST  18  /  ALT  18  /  AlkPhos  42  06-10    RECENT CULTURES:    RADIOLOGY & ADDITIONAL TESTS: all reviewed   EKG  reviewed      12 Lead ECG (06.10.22 @ 08:29) >  Ventricular Rate 92 BPM  QTC Calculation(Bazett) 437 ms  Diagnosis Line Normal sinus rhythm  Cannot rule out Anterior infarct , age undetermined  Abnormal ECG  No previous ECGs available     US Duplex Arterial Lower Ext Ltd, Left (06.10.22 @ 11:09) >  FINDINGS: The visualized left common femoral artery and femoral artery   are patent.. The left common femoral vein is patent No pseudoaneurysm   identified. There is a large complex collectionin the left groin   measuring 6.3 x 2.8 x 6.1 cm, without internal vascularity. Few prominent   left groin lymph nodes measuring up to 1.5 cm.    IMPRESSION: No pseudoaneurysm visualized on this exam. Complex left groin   collection measuring 6.3 cm,likely hematoma. Clinical correlation and   follow-up is recommended.     TTE Echo Complete w/o Contrast w/ Doppler (06.10.22 @ 12:06) >   The basal septal wall is akinetic   The basal inferior wall is akinetic. All remaining segments demonstrate   normal thickening and contractility.   Estimated left ventricular ejection fraction is 55 %.   The leftventricle is normal in size and wall thickness.   Normal appearing left atrium.   Normal appearing right atrium.   Normal appearing right ventricle structure and function.   Normal aortic valve structure and function.   The mitral valve leaflets appear thin and normal.   Trace mitral regurgitation is present.   The tricuspid valve leaflets are thin and pliable; valve motion is   normal.   Trace tricuspid valve regurgitation is present.   No evidence of pericardial effusion.   The IVC appears normal.     Xray Chest 1 View- PORTABLE-Urgent (06.10.22 @ 10:35) >  FINDINGS: The lungs are clear. The cardiomediastinal silhouette is   normal. There are mild multilevel degenerative changes of the thoracic   spine.    IMPRESSION: Clear lungs.              Current medications:  acetaminophen     Tablet .. 650 milliGRAM(s) Oral every 6 hours PRN  ALBUTerol    90 MICROgram(s) HFA Inhaler 2 Puff(s) Inhalation every 6 hours PRN  aspirin enteric coated 81 milliGRAM(s) Oral daily  atorvastatin 40 milliGRAM(s) Oral at bedtime  buPROPion XL (24-Hour) . 150 milliGRAM(s) Oral daily  citalopram 20 milliGRAM(s) Oral daily  colchicine 0.6 milliGRAM(s) Oral two times a day  doxepin 50 milliGRAM(s) Oral at bedtime  furosemide    Tablet 20 milliGRAM(s) Oral daily  ibuprofen  Tablet. 400 milliGRAM(s) Oral three times a day  metoprolol succinate ER 25 milliGRAM(s) Oral two times a day  nicotine -   7 mG/24Hr(s) Patch 1 patch Transdermal daily  prasugrel 10 milliGRAM(s) Oral daily  triamterene 37.5 mG/hydrochlorothiazide 25 mG Tablet 1 Tablet(s) Oral daily

## 2022-06-11 NOTE — PROGRESS NOTE ADULT - ASSESSMENT
47 year old female w hx CAD s/p 5 stents 1 month ago, hx gastric bypass, HTN presented to ED c/o chest pain    #Chest pain poss ACS  #CAD s/p 5 stents 4 weeks ago  1. admit to telemetry  2. serial trop  3. cardiology consulted  4. ECHO pending  5. continue home meds  ASA 81 mg  prasugel 10 mg\atorvastatin 40 mg  metoprolol 25 mg BID w parameters      #mildly elevated BNP 600s  1. daily weights  2. continue lasix 20 mg qd  3. EF pending      #L groin pain  due to hematoma from prior cath  1. warm compress  2. ambulas tolerated      #L foot paresthesia  s/p cath  1. ambulate w cane      #HTN  1. beta blocker as above  2. triamterine/HCTZ  3. lasix      #Hx gastric bypass  stable      #Headache  1. 1. acetaminophen 1000 mg IV        #PSYCH   anxiety and depression  1. continue wellbutrin  2. doxepin 50 mg q HS for insomnia        #Tobacco use  has cut down over past month from 1 ppd to 1-2 cig qd  1. nicotine patch    #MISC  1.  re shelter placement on discharge      #VTE  low risk     47 year old female w hx CAD s/p 5 stents 1 month ago, hx gastric bypass, HTN admitted with  chest pain    Chest pain due to pericarditis , post MI Dressler's syndrome  CAD s/p PCI 5 weeks ago   -  telemetry,  serial trop  - 2 d echo - noted  - NSAIDs and colchicine   - c/w ASA , prasugel, atorvastatin , BB   -  cardiology consult appreciated     Mildly elevated BNP 600s, CXR clear doubt CHF   - 2 d echo - EF wnl   -  daily weights,  continue lasix 20 mg qd    Left groin 6 cm hematoma  associated with recent  Cardiac cath   -  warm compress,  ambulas tolerated    L foot paresthesia  - s/p cath,  ambulate w cane, check B12, folate , TSH    HTN  - c/w  beta blocker as above,  triamterine/HCTZ, lasix    Anxiety and depression  - continue wellbutrin,  doxepin 50 mg q HS for insomnia    Headache, resolved  - s/p  acetaminophen 1000 mg IV    Tobacco use  disorder   has cut down over past month from 1 ppd to 1-2 cig qd, c/w  nicotine patch    Hx gastric bypass stable    Social problems   -  SW re shelter placement on discharge      VTE  low risk

## 2022-06-11 NOTE — PHYSICAL THERAPY INITIAL EVALUATION ADULT - PERTINENT HX OF CURRENT PROBLEM, REHAB EVAL
Pt admitted to  secondary to chest pain. Pt with a hx of CAD. Pt s/p 5 stents ~ 1 month ago in [place]. Duplex arterial LLE: left groin hematoma 6.3 cm.

## 2022-06-12 VITALS
TEMPERATURE: 98 F | RESPIRATION RATE: 18 BRPM | SYSTOLIC BLOOD PRESSURE: 110 MMHG | DIASTOLIC BLOOD PRESSURE: 44 MMHG | OXYGEN SATURATION: 100 % | HEART RATE: 83 BPM

## 2022-06-12 LAB
24R-OH-CALCIDIOL SERPL-MCNC: 13.3 NG/ML — LOW (ref 30–80)
ALBUMIN SERPL ELPH-MCNC: 3 G/DL — LOW (ref 3.3–5)
ALP SERPL-CCNC: 30 U/L — LOW (ref 40–120)
ALT FLD-CCNC: 20 U/L — SIGNIFICANT CHANGE UP (ref 12–78)
ANION GAP SERPL CALC-SCNC: 6 MMOL/L — SIGNIFICANT CHANGE UP (ref 5–17)
AST SERPL-CCNC: 17 U/L — SIGNIFICANT CHANGE UP (ref 15–37)
BASOPHILS # BLD AUTO: 0.03 K/UL — SIGNIFICANT CHANGE UP (ref 0–0.2)
BASOPHILS NFR BLD AUTO: 0.5 % — SIGNIFICANT CHANGE UP (ref 0–2)
BILIRUB SERPL-MCNC: 0.3 MG/DL — SIGNIFICANT CHANGE UP (ref 0.2–1.2)
BUN SERPL-MCNC: 27 MG/DL — HIGH (ref 7–23)
CALCIUM SERPL-MCNC: 9.1 MG/DL — SIGNIFICANT CHANGE UP (ref 8.5–10.1)
CHLORIDE SERPL-SCNC: 108 MMOL/L — SIGNIFICANT CHANGE UP (ref 96–108)
CK SERPL-CCNC: 41 U/L — SIGNIFICANT CHANGE UP (ref 26–192)
CO2 SERPL-SCNC: 26 MMOL/L — SIGNIFICANT CHANGE UP (ref 22–31)
CREAT SERPL-MCNC: 1.4 MG/DL — HIGH (ref 0.5–1.3)
CRP SERPL-MCNC: <3 MG/L — SIGNIFICANT CHANGE UP
EGFR: 46 ML/MIN/1.73M2 — LOW
EOSINOPHIL # BLD AUTO: 0.22 K/UL — SIGNIFICANT CHANGE UP (ref 0–0.5)
EOSINOPHIL NFR BLD AUTO: 3.4 % — SIGNIFICANT CHANGE UP (ref 0–6)
ERYTHROCYTE [SEDIMENTATION RATE] IN BLOOD: 19 MM/HR — HIGH (ref 0–15)
FOLATE SERPL-MCNC: 5.3 NG/ML — SIGNIFICANT CHANGE UP
GLUCOSE SERPL-MCNC: 92 MG/DL — SIGNIFICANT CHANGE UP (ref 70–99)
HCT VFR BLD CALC: 33.3 % — LOW (ref 34.5–45)
HGB BLD-MCNC: 10.7 G/DL — LOW (ref 11.5–15.5)
IMM GRANULOCYTES NFR BLD AUTO: 0.2 % — SIGNIFICANT CHANGE UP (ref 0–1.5)
LYMPHOCYTES # BLD AUTO: 1.68 K/UL — SIGNIFICANT CHANGE UP (ref 1–3.3)
LYMPHOCYTES # BLD AUTO: 26 % — SIGNIFICANT CHANGE UP (ref 13–44)
MAGNESIUM SERPL-MCNC: 2.3 MG/DL — SIGNIFICANT CHANGE UP (ref 1.6–2.6)
MCHC RBC-ENTMCNC: 27.5 PG — SIGNIFICANT CHANGE UP (ref 27–34)
MCHC RBC-ENTMCNC: 32.1 GM/DL — SIGNIFICANT CHANGE UP (ref 32–36)
MCV RBC AUTO: 85.6 FL — SIGNIFICANT CHANGE UP (ref 80–100)
MONOCYTES # BLD AUTO: 0.54 K/UL — SIGNIFICANT CHANGE UP (ref 0–0.9)
MONOCYTES NFR BLD AUTO: 8.3 % — SIGNIFICANT CHANGE UP (ref 2–14)
NEUTROPHILS # BLD AUTO: 3.99 K/UL — SIGNIFICANT CHANGE UP (ref 1.8–7.4)
NEUTROPHILS NFR BLD AUTO: 61.6 % — SIGNIFICANT CHANGE UP (ref 43–77)
PHOSPHATE SERPL-MCNC: 4.3 MG/DL — SIGNIFICANT CHANGE UP (ref 2.5–4.5)
PLATELET # BLD AUTO: 354 K/UL — SIGNIFICANT CHANGE UP (ref 150–400)
POTASSIUM SERPL-MCNC: 4.4 MMOL/L — SIGNIFICANT CHANGE UP (ref 3.5–5.3)
POTASSIUM SERPL-SCNC: 4.4 MMOL/L — SIGNIFICANT CHANGE UP (ref 3.5–5.3)
PROT SERPL-MCNC: 7.3 GM/DL — SIGNIFICANT CHANGE UP (ref 6–8.3)
RBC # BLD: 3.89 M/UL — SIGNIFICANT CHANGE UP (ref 3.8–5.2)
RBC # FLD: 23.1 % — HIGH (ref 10.3–14.5)
SODIUM SERPL-SCNC: 140 MMOL/L — SIGNIFICANT CHANGE UP (ref 135–145)
TROPONIN I, HIGH SENSITIVITY RESULT: 35.71 NG/L — SIGNIFICANT CHANGE UP
TSH SERPL-MCNC: 1.05 UU/ML — SIGNIFICANT CHANGE UP (ref 0.34–4.82)
VIT B12 SERPL-MCNC: 834 PG/ML — SIGNIFICANT CHANGE UP (ref 232–1245)
WBC # BLD: 6.47 K/UL — SIGNIFICANT CHANGE UP (ref 3.8–10.5)
WBC # FLD AUTO: 6.47 K/UL — SIGNIFICANT CHANGE UP (ref 3.8–10.5)

## 2022-06-12 PROCEDURE — 99239 HOSP IP/OBS DSCHRG MGMT >30: CPT

## 2022-06-12 RX ORDER — ACETAMINOPHEN 500 MG
1 TABLET ORAL
Qty: 45 | Refills: 0
Start: 2022-06-12 | End: 2022-06-26

## 2022-06-12 RX ORDER — COLCHICINE 0.6 MG
1 TABLET ORAL
Qty: 28 | Refills: 0
Start: 2022-06-12 | End: 2022-06-25

## 2022-06-12 RX ORDER — FUROSEMIDE 40 MG
1 TABLET ORAL
Qty: 0 | Refills: 0 | DISCHARGE

## 2022-06-12 RX ORDER — NICOTINE POLACRILEX 2 MG
1 GUM BUCCAL
Qty: 14 | Refills: 0
Start: 2022-06-12 | End: 2022-06-25

## 2022-06-12 RX ORDER — ERGOCALCIFEROL 1.25 MG/1
1 CAPSULE ORAL
Qty: 4 | Refills: 0
Start: 2022-06-12 | End: 2022-07-11

## 2022-06-12 RX ADMIN — Medication 81 MILLIGRAM(S): at 09:33

## 2022-06-12 RX ADMIN — CITALOPRAM 20 MILLIGRAM(S): 10 TABLET, FILM COATED ORAL at 09:34

## 2022-06-12 RX ADMIN — Medication 20 MILLIGRAM(S): at 09:36

## 2022-06-12 RX ADMIN — Medication 1 PATCH: at 09:35

## 2022-06-12 RX ADMIN — Medication 0.6 MILLIGRAM(S): at 09:33

## 2022-06-12 RX ADMIN — Medication 400 MILLIGRAM(S): at 05:53

## 2022-06-12 RX ADMIN — Medication 25 MILLIGRAM(S): at 09:34

## 2022-06-12 RX ADMIN — BUPROPION HYDROCHLORIDE 150 MILLIGRAM(S): 150 TABLET, EXTENDED RELEASE ORAL at 09:34

## 2022-06-12 RX ADMIN — PRASUGREL 10 MILLIGRAM(S): 5 TABLET, FILM COATED ORAL at 09:33

## 2022-06-12 RX ADMIN — Medication 1 TABLET(S): at 09:33

## 2022-06-12 NOTE — DISCHARGE NOTE NURSING/CASE MANAGEMENT/SOCIAL WORK - NSDCPEFALRISK_GEN_ALL_CORE
For information on Fall & Injury Prevention, visit: [email or web address] OR  [email or web address] OR  https://www.cdc.gov/steadi/patient.html

## 2022-06-12 NOTE — DISCHARGE NOTE NURSING/CASE MANAGEMENT/SOCIAL WORK - NSDCPEWEB_GEN_ALL_CORE
[facility] for Tobacco Control website --- [email or web address] website --- [email or web address]

## 2022-06-12 NOTE — DISCHARGE NOTE NURSING/CASE MANAGEMENT/SOCIAL WORK - NSDCPNINST_GEN_ALL_CORE
unable to make appointment on sunday. will make an appointment for patient on monday morning and will call patient to follow up.

## 2022-06-12 NOTE — DIETITIAN INITIAL EVALUATION ADULT - PERTINENT LABORATORY DATA
06-10    136  |  107  |  24<H>  ----------------------------<  87  4.3   |  24  |  1.20    Ca    9.1      10 Kang 2022 09:03    TPro  7.8  /  Alb  3.2<L>  /  TBili  0.3  /  DBili  x   /  AST  18  /  ALT  18  /  AlkPhos  42  06-10  A1C with Estimated Average Glucose Result: 5.4 % (06-11-22 @ 08:19)    Vitamin B12, Serum: 786 pg/mL (06-11-22 @ 08:19)

## 2022-06-12 NOTE — DISCHARGE NOTE PROVIDER - NSDCMRMEDTOKEN_GEN_ALL_CORE_FT
Albuterol (Eqv-ProAir HFA) 90 mcg/inh inhalation aerosol: 2 puff(s) inhaled every 4 hours, As Needed - for shortness of breath and/or wheezing  Aspir 81 oral delayed release tablet: 1 tab(s) orally once a day  atorvastatin 40 mg oral tablet: 1 tab(s) orally once a day (at bedtime)  citalopram 20 mg oral tablet: 1 tab(s) orally once a day  colchicine 0.6 mg oral tablet: 1 tab(s) orally 2 times a day  doxepin 50 mg oral capsule: 1 cap(s) orally once a day (at bedtime)  ergocalciferol 1.25 mg (50,000 intl units) oral capsule: 1 cap(s) orally once a week   metoprolol succinate 25 mg oral tablet, extended release: 1 tab(s) orally 2 times a day  nicotine 7 mg/24 hr transdermal film, extended release: 1 patch transdermal once a day   prasugrel 10 mg oral tablet: 1 tab(s) orally once a day  triamterene-hydrochlorothiazide 37.5 mg-25 mg oral tablet: 1 tab(s) orally once a day  Tylenol 8 Hour 650 mg oral tablet, extended release: 1 tab(s) orally every 8 hours, As Needed -for mild pain   Wellbutrin  mg/24 hours oral tablet, extended release: 1 tab(s) orally every 24 hours

## 2022-06-12 NOTE — DISCHARGE NOTE PROVIDER - PROVIDER TOKENS
PROVIDER:[TOKEN:[[id number]],FOLLOWUP:[1 week]],PROVIDER:[TOKEN:[[id number]],FOLLOWUP:[1 week]]

## 2022-06-12 NOTE — DIETITIAN INITIAL EVALUATION ADULT - ADD RECOMMEND
1) Encourage protein-rich foods, maximize food preferences, 2) MVI w/ minerals daily to ensure 100% RDA met, 3) Monitor bowel movements, if no BM for >3 days, consider implementing bowel regimen, 4) Obtain vitamin D 25OH, vitamin A, zinc, thiamine, copper, B12, MMA, and folate levels 2/2 pmhx bariatric surgery with high risk of insufficiency/ deficiency. RD will continue to monitor PO intake, labs, hydration, and wt prn.

## 2022-06-12 NOTE — DIETITIAN INITIAL EVALUATION ADULT - LAB (SPECIFY)
Obtain vitamin D 25OH, vitamin A, zinc, thiamine, copper, B12, MMA, and folate levels 2/2 pmhx bariatric surgery with high risk of insufficiency/ deficiency

## 2022-06-12 NOTE — DISCHARGE NOTE PROVIDER - NSDCCPCAREPLAN_GEN_ALL_CORE_FT
PRINCIPAL DISCHARGE DIAGNOSIS  Diagnosis: Dressler's syndrome  Assessment and Plan of Treatment: take colchicine twice daily with tylenol follow up with Dr. Velasco within 1week      SECONDARY DISCHARGE DIAGNOSES  Diagnosis: Abnormal renal function  Assessment and Plan of Treatment: drink plenty of fluids, stop taking lasix ( furosemide) follow up with PCP within1  week to repeat your renal function    Diagnosis: Vitamin D deficiency  Assessment and Plan of Treatment: take high dose of vitamin D once a week as prescribed, follow up with PCP to repeat the level and adjust the dose as needed    Diagnosis: Groin hematoma  Assessment and Plan of Treatment: take tylenol as needed, follow up with cardiologist within 1 week for further monitoring     PRINCIPAL DISCHARGE DIAGNOSIS  Diagnosis: Dressler's syndrome  Assessment and Plan of Treatment: take colchicine twice daily with tylenol follow up with Dr. Velasco within 1week  Due to recent significant heart problems and left leg pain due to hematoma you need to avoid stressfull enviroment, as well as have  limited use of stairs due to dificculties breathing on walking  and current chest and left leg pain.      SECONDARY DISCHARGE DIAGNOSES  Diagnosis: Abnormal renal function  Assessment and Plan of Treatment: drink plenty of fluids, stop taking lasix ( furosemide) follow up with PCP within1  week to repeat your renal function    Diagnosis: Vitamin D deficiency  Assessment and Plan of Treatment: take high dose of vitamin D once a week as prescribed, follow up with PCP to repeat the level and adjust the dose as needed    Diagnosis: Groin hematoma  Assessment and Plan of Treatment: take tylenol as needed, follow up with cardiologist within 1 week for further monitoring

## 2022-06-12 NOTE — DIETITIAN INITIAL EVALUATION ADULT - PERTINENT MEDS FT
MEDICATIONS  (STANDING):  aspirin enteric coated 81 milliGRAM(s) Oral daily  atorvastatin 40 milliGRAM(s) Oral at bedtime  buPROPion XL (24-Hour) . 150 milliGRAM(s) Oral daily  citalopram 20 milliGRAM(s) Oral daily  colchicine 0.6 milliGRAM(s) Oral two times a day  doxepin 50 milliGRAM(s) Oral at bedtime  furosemide    Tablet 20 milliGRAM(s) Oral daily  ibuprofen  Tablet. 400 milliGRAM(s) Oral three times a day  metoprolol succinate ER 25 milliGRAM(s) Oral two times a day  nicotine -   7 mG/24Hr(s) Patch 1 patch Transdermal daily  prasugrel 10 milliGRAM(s) Oral daily  triamterene 37.5 mG/hydrochlorothiazide 25 mG Tablet 1 Tablet(s) Oral daily    MEDICATIONS  (PRN):  acetaminophen     Tablet .. 650 milliGRAM(s) Oral every 6 hours PRN Temp greater or equal to 38C (100.4F), Mild Pain (1 - 3)  ALBUTerol    90 MICROgram(s) HFA Inhaler 2 Puff(s) Inhalation every 6 hours PRN Shortness of Breath

## 2022-06-12 NOTE — DIETITIAN INITIAL EVALUATION ADULT - OTHER INFO
47 year old female w hx CAD s/p 5 stents 1 month ago, hx gastric bypass (2017), HTN presented to ED c/o chest pain. Social work consulted for SNAP benefits/ living situation. lives in shelter with 2 kids.     Pt states she is eating well with no complaints. Has lost 200# since bariatric surgery in 2017. Reports UBW ~168- 175#. Current admit wt 145#, ? accuracy as pt denies recent wt loss and appears overwt. Bed scale not working properly to confirm wt. No noted signs of muscle/ fat wasting at this time. Pt not at nutritional risk - but at risk for multiple vit/ min deficiencies 2/2 bariatric surgery. Rec'd to check levels and supplement appropriately. See recommendations below.

## 2022-06-12 NOTE — DISCHARGE NOTE PROVIDER - HOSPITAL COURSE
Patient is a 47y old  Female who presents with a chief complaint of chest pain     HPI:  47 year old female w hx CAD s/p 5 stents 1 month ago, hx gastric bypass, HTN, nicotine dependence, lives in shelter with 2 kids admitted on 6/10/22 with  c/o chest pain.  SInce her cath 4 weeks ago in [place] she has not felt well , c/o fatigue. + L groin pain w numbness to L foot after cath, now walking w cane, + substernal CP w radiation to L neck intermittent x 2 days, Occurs w minimal exertion 6-7/10, Associated w +palpitations, +diaphoresis "clammy", + nausea, SOB, not sure what makes pain better activity makes it worse.  Also c/o posterior headache, not sure if it is stress related .   In ED /68   HR 90   RR 18   T 98.9  98% sat RA, trop neg x 1, EKG NSR no acute ST-T changes, cardiology consulted, echo performed, doppler L groin + hematoma    6/11 -   Patient seen and examined at bedside earlier today, reports no chest pain, left groin pain on ambulation persist, afebrile, dyspnea on exertion, denies headache, dizziness, palpitations  6/12 - pt seen and examined , has some chest discomfort but much improved, eager to go home, denies palpitations, dyspnea, abdominal pain, afebrile, plan discussed    Review of system- Rest of the review of system are negative except mentioned in HPI  Vitals reviewed for last 24 hours  T(C): 36.6 (06-12-22 @ 09:04), Max: 36.8 (06-11-22 @ 15:46)  T(F): 97.8 (06-12-22 @ 09:04), Max: 98.3 (06-11-22 @ 20:31)  HR: 83 (06-12-22 @ 09:04) (83 - 94)  BP: 110/44 (06-12-22 @ 09:04) (98/64 - 110/44)  RR: 18 (06-12-22 @ 09:04) (18 - 18)  SpO2: 100% (06-12-22 @ 09:04) (97% - 100%)  Wt(kg): --    Physical exam:   General : NAD, appear to be of stated age , well groomed   NERVOUS SYSTEM:  Alert & Oriented X3, non- focal exam, Motor Strength 5/5 B/L upper and lower extremities; DTRs 2+ intact and symmetric  HEAD:  Atraumatic, Normocephalic  EYES: EOMI, PERRLA, conjunctiva and sclera clear  HEENT: Moist mucous membranes, Supple neck , No JVD  CHEST: Clear to auscultation bilaterally; No rales, no rhonchi, no wheezing  HEART: Regular rate and rhythm; No murmurs, no rubs or gallops  ABDOMEN: Soft, Non-tender, Non-distended; Bowel sounds present, no guarding , no peritoneal irritation , + left groin lump tender to touch  GENITOURINARY- Voiding, no suprapubic tenderness  EXTREMITIES:  2+ Peripheral Pulses, No clubbing, cyanosis,   edema  MUSCULOSKELETAL:- No muscle tenderness, Muscle tone normal, No joint tenderness, no Joint swelling,  Joint ROM –normal   SKIN-no rash, no lesion    LABS: all reviewed  Creatinine Trend: 1.40<--, 1.20<--  Assessment and Plan:   · Assessment    47 year old female w hx CAD s/p 5 stents 1 month ago, hx gastric bypass, HTN admitted with  chest pain    Chest pain due to pericarditis , post MI Dressler's syndrome  CAD s/p PCI 5 weeks ago   -  telemetry,  serial trop  - 2 d echo - noted  - NSAIDs and colchicine , will d/c ibuprofen due to worsening of renal function today  - c/w ASA , prasugel, atorvastatin , BB   -  cardiology consult appreciated     Mildly elevated BNP 600s, CXR clear doubt CHF   - 2 d echo - EF wnl   -  daily weights  - stop lasix due to renal function    Left groin 6 cm hematoma  associated with recent  Cardiac cath   -  warm compress,  ambulate  as tolerated    L foot paresthesia  - s/p cath,  ambulate w cane, check B12, folate , TSH wnl     HTN  - c/w  beta blocker as above,  triamterine/HCTZ, lasix    Anxiety and depression  - continue wellbutrin,  doxepin 50 mg q HS for insomnia    Headache, resolved  - s/p  acetaminophen 1000 mg IV  Tobacco use  disorder   has cut down over past month from 1 ppd to 1-2 cig qd, c/w  nicotine patch    Hx gastric bypass stable    Severe vitamin D deficiency - replace with high dose weekly     Social problems   -  now patient wants to go to her mom house     Final diagnosis, treatment plan, and follow-up recommendations were discussed and explained to the patient.   The patient was given an opportunity to ask questions concerning the diagnosis and treatment plan.   The patient acknowledged understanding of the diagnosis, treatment, and follow-up recommendations.   The patient was advised to seek urgent care upon discharge if worsening symptoms develop prior to scheduled follow-up.   Time spent on discharge included time with the patient, and also coordinating discharge care as outlined below.  no PCP will advised [place] PCP upon discharge to follow up   Total time spent: 50 min

## 2022-06-12 NOTE — DISCHARGE NOTE PROVIDER - CARE PROVIDER_API CALL
Earline St)  Internal Medicine  [address], [address]  [place] [address]  Phone: [phone]  Fax: [phone]  Follow Up Time: 1 week    Umer Velasco)  Cardiology; Interventional Cardiology  [address], [place] [address]  Phone: [phone]  Fax: [phone]  Follow Up Time: 1 week

## 2022-06-12 NOTE — DISCHARGE NOTE NURSING/CASE MANAGEMENT/SOCIAL WORK - PATIENT PORTAL LINK FT
You can access the FollowMyHealth Patient Portal offered by [facility] by registering at the following website: [email or web address]. By joining [employer]’s FollowMyHealth portal, you will also be able to view your health information using other applications (apps) compatible with our system.

## 2022-06-20 DIAGNOSIS — Z88.0 ALLERGY STATUS TO PENICILLIN: ICD-10-CM

## 2022-06-20 DIAGNOSIS — E55.9 VITAMIN D DEFICIENCY, UNSPECIFIED: ICD-10-CM

## 2022-06-20 DIAGNOSIS — R20.2 PARESTHESIA OF SKIN: ICD-10-CM

## 2022-06-20 DIAGNOSIS — R77.8 OTHER SPECIFIED ABNORMALITIES OF PLASMA PROTEINS: ICD-10-CM

## 2022-06-20 DIAGNOSIS — Z59.01 SHELTERED HOMELESSNESS: ICD-10-CM

## 2022-06-20 DIAGNOSIS — I25.10 ATHEROSCLEROTIC HEART DISEASE OF NATIVE CORONARY ARTERY WITHOUT ANGINA PECTORIS: ICD-10-CM

## 2022-06-20 DIAGNOSIS — F17.210 NICOTINE DEPENDENCE, CIGARETTES, UNCOMPLICATED: ICD-10-CM

## 2022-06-20 DIAGNOSIS — L76.32 POSTPROCEDURAL HEMATOMA OF SKIN AND SUBCUTANEOUS TISSUE FOLLOWING OTHER PROCEDURE: ICD-10-CM

## 2022-06-20 DIAGNOSIS — Z98.84 BARIATRIC SURGERY STATUS: ICD-10-CM
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
## 2022-06-20 DIAGNOSIS — R94.4 ABNORMAL RESULTS OF KIDNEY FUNCTION STUDIES: ICD-10-CM

## 2022-06-20 DIAGNOSIS — I10 ESSENTIAL (PRIMARY) HYPERTENSION: ICD-10-CM

## 2022-06-20 DIAGNOSIS — Z95.5 PRESENCE OF CORONARY ANGIOPLASTY IMPLANT AND GRAFT: ICD-10-CM

## 2022-06-20 DIAGNOSIS — I24.1 DRESSLER'S SYNDROME: ICD-10-CM

## 2022-06-20 DIAGNOSIS — F32.A DEPRESSION, UNSPECIFIED: ICD-10-CM

## 2022-06-20 DIAGNOSIS — R51.9 HEADACHE, UNSPECIFIED: ICD-10-CM

## 2022-06-20 DIAGNOSIS — F41.9 ANXIETY DISORDER, UNSPECIFIED: ICD-10-CM

## 2022-06-20 DIAGNOSIS — I31.9 DISEASE OF PERICARDIUM, UNSPECIFIED: ICD-10-CM

## 2022-06-20 SDOH — ECONOMIC STABILITY - HOUSING INSECURITY: SHELTERED HOMELESSNESS: Z59.01

## 2023-04-20 NOTE — PATIENT PROFILE ADULT - NSPROMEDSADMININFO_GEN_A_NUR
Patient tolerated compression wraps well, continue with Coban 2 Lite 50% stretch to bilateral lower legs  
no concerns

## 2023-06-15 ENCOUNTER — EMERGENCY (EMERGENCY)
Facility: HOSPITAL | Age: 48
LOS: 1 days | Discharge: HOME | End: 2023-06-15
Attending: EMERGENCY MEDICINE
Payer: MEDICAID

## 2023-06-15 VITALS
WEIGHT: 164.91 LBS | RESPIRATION RATE: 20 BRPM | HEIGHT: 62 IN | HEART RATE: 98 BPM | OXYGEN SATURATION: 98 % | DIASTOLIC BLOOD PRESSURE: 92 MMHG | TEMPERATURE: 98 F | SYSTOLIC BLOOD PRESSURE: 138 MMHG

## 2023-06-15 PROCEDURE — 93010 ELECTROCARDIOGRAM REPORT: CPT

## 2023-06-15 PROCEDURE — 99285 EMERGENCY DEPT VISIT HI MDM: CPT

## 2023-06-16 VITALS
RESPIRATION RATE: 19 BRPM | OXYGEN SATURATION: 100 % | SYSTOLIC BLOOD PRESSURE: 135 MMHG | DIASTOLIC BLOOD PRESSURE: 88 MMHG | HEART RATE: 98 BPM

## 2023-06-16 PROBLEM — I25.10 ATHEROSCLEROTIC HEART DISEASE OF NATIVE CORONARY ARTERY WITHOUT ANGINA PECTORIS: Chronic | Status: ACTIVE | Noted: 2022-06-10

## 2023-06-16 LAB
ALBUMIN SERPL ELPH-MCNC: 3.9 G/DL — SIGNIFICANT CHANGE UP (ref 3.3–5.2)
ALP SERPL-CCNC: 41 U/L — SIGNIFICANT CHANGE UP (ref 40–120)
ALT FLD-CCNC: 18 U/L — SIGNIFICANT CHANGE UP
ANION GAP SERPL CALC-SCNC: 13 MMOL/L — SIGNIFICANT CHANGE UP (ref 5–17)
AST SERPL-CCNC: 25 U/L — SIGNIFICANT CHANGE UP
BASOPHILS # BLD AUTO: 0.01 K/UL — SIGNIFICANT CHANGE UP (ref 0–0.2)
BASOPHILS NFR BLD AUTO: 0.1 % — SIGNIFICANT CHANGE UP (ref 0–2)
BILIRUB SERPL-MCNC: <0.2 MG/DL — LOW (ref 0.4–2)
BUN SERPL-MCNC: 19.1 MG/DL — SIGNIFICANT CHANGE UP (ref 8–20)
CALCIUM SERPL-MCNC: 9.5 MG/DL — SIGNIFICANT CHANGE UP (ref 8.4–10.5)
CHLORIDE SERPL-SCNC: 104 MMOL/L — SIGNIFICANT CHANGE UP (ref 96–108)
CO2 SERPL-SCNC: 21 MMOL/L — LOW (ref 22–29)
CREAT SERPL-MCNC: 0.96 MG/DL — SIGNIFICANT CHANGE UP (ref 0.5–1.3)
EGFR: 73 ML/MIN/1.73M2 — SIGNIFICANT CHANGE UP
EOSINOPHIL # BLD AUTO: 0.16 K/UL — SIGNIFICANT CHANGE UP (ref 0–0.5)
EOSINOPHIL NFR BLD AUTO: 1.9 % — SIGNIFICANT CHANGE UP (ref 0–6)
GLUCOSE SERPL-MCNC: 110 MG/DL — HIGH (ref 70–99)
HCG SERPL-ACNC: <4 MIU/ML — SIGNIFICANT CHANGE UP
HCT VFR BLD CALC: 34.8 % — SIGNIFICANT CHANGE UP (ref 34.5–45)
HGB BLD-MCNC: 11.4 G/DL — LOW (ref 11.5–15.5)
IMM GRANULOCYTES NFR BLD AUTO: 0.2 % — SIGNIFICANT CHANGE UP (ref 0–0.9)
LYMPHOCYTES # BLD AUTO: 2.42 K/UL — SIGNIFICANT CHANGE UP (ref 1–3.3)
LYMPHOCYTES # BLD AUTO: 29.2 % — SIGNIFICANT CHANGE UP (ref 13–44)
MCHC RBC-ENTMCNC: 28.8 PG — SIGNIFICANT CHANGE UP (ref 27–34)
MCHC RBC-ENTMCNC: 32.8 GM/DL — SIGNIFICANT CHANGE UP (ref 32–36)
MCV RBC AUTO: 87.9 FL — SIGNIFICANT CHANGE UP (ref 80–100)
MONOCYTES # BLD AUTO: 0.6 K/UL — SIGNIFICANT CHANGE UP (ref 0–0.9)
MONOCYTES NFR BLD AUTO: 7.2 % — SIGNIFICANT CHANGE UP (ref 2–14)
NEUTROPHILS # BLD AUTO: 5.09 K/UL — SIGNIFICANT CHANGE UP (ref 1.8–7.4)
NEUTROPHILS NFR BLD AUTO: 61.4 % — SIGNIFICANT CHANGE UP (ref 43–77)
PLATELET # BLD AUTO: 394 K/UL — SIGNIFICANT CHANGE UP (ref 150–400)
POTASSIUM SERPL-MCNC: 3.9 MMOL/L — SIGNIFICANT CHANGE UP (ref 3.5–5.3)
POTASSIUM SERPL-SCNC: 3.9 MMOL/L — SIGNIFICANT CHANGE UP (ref 3.5–5.3)
PROT SERPL-MCNC: 7.2 G/DL — SIGNIFICANT CHANGE UP (ref 6.6–8.7)
RBC # BLD: 3.96 M/UL — SIGNIFICANT CHANGE UP (ref 3.8–5.2)
RBC # FLD: 15.5 % — HIGH (ref 10.3–14.5)
SODIUM SERPL-SCNC: 138 MMOL/L — SIGNIFICANT CHANGE UP (ref 135–145)
TROPONIN T SERPL-MCNC: <0.01 NG/ML — SIGNIFICANT CHANGE UP (ref 0–0.06)
WBC # BLD: 8.3 K/UL — SIGNIFICANT CHANGE UP (ref 3.8–10.5)
WBC # FLD AUTO: 8.3 K/UL — SIGNIFICANT CHANGE UP (ref 3.8–10.5)

## 2023-06-16 PROCEDURE — 71045 X-RAY EXAM CHEST 1 VIEW: CPT

## 2023-06-16 PROCEDURE — 85025 COMPLETE CBC W/AUTO DIFF WBC: CPT

## 2023-06-16 PROCEDURE — 96374 THER/PROPH/DIAG INJ IV PUSH: CPT

## 2023-06-16 PROCEDURE — 71045 X-RAY EXAM CHEST 1 VIEW: CPT | Mod: 26

## 2023-06-16 PROCEDURE — 94640 AIRWAY INHALATION TREATMENT: CPT

## 2023-06-16 PROCEDURE — 80053 COMPREHEN METABOLIC PANEL: CPT

## 2023-06-16 PROCEDURE — 93005 ELECTROCARDIOGRAM TRACING: CPT

## 2023-06-16 PROCEDURE — 84484 ASSAY OF TROPONIN QUANT: CPT

## 2023-06-16 PROCEDURE — 99285 EMERGENCY DEPT VISIT HI MDM: CPT | Mod: 25

## 2023-06-16 PROCEDURE — 36415 COLL VENOUS BLD VENIPUNCTURE: CPT

## 2023-06-16 PROCEDURE — 84702 CHORIONIC GONADOTROPIN TEST: CPT

## 2023-06-16 RX ORDER — KETOROLAC TROMETHAMINE 30 MG/ML
15 SYRINGE (ML) INJECTION ONCE
Refills: 0 | Status: DISCONTINUED | OUTPATIENT
Start: 2023-06-16 | End: 2023-06-16

## 2023-06-16 RX ORDER — IPRATROPIUM/ALBUTEROL SULFATE 18-103MCG
3 AEROSOL WITH ADAPTER (GRAM) INHALATION ONCE
Refills: 0 | Status: COMPLETED | OUTPATIENT
Start: 2023-06-16 | End: 2023-06-16

## 2023-06-16 RX ADMIN — Medication 3 MILLILITER(S): at 00:26

## 2023-06-16 RX ADMIN — Medication 15 MILLIGRAM(S): at 01:18

## 2023-06-16 NOTE — ED ADULT NURSE REASSESSMENT NOTE - NS ED NURSE REASSESS COMMENT FT1
Plan of care and results discussed with pt by md. Pt in no acute distress at time discharge, ambulated off unit off without difficulty.

## 2023-06-16 NOTE — ED PROVIDER NOTE - INTERPRETATION
10/22/2020    Procedure    Mohini Shaw [id number]     Pre-Operative Dx:  Left proximal Ureteral stone     Post-Operative Dx:  Left lower pole Renal stone    Procedure:  Right ureteral dilation with ureteroscopy, lower pole calyx lithotripsy, basket extraction of stone with stent placement (Cpt 60985)    Surgeon:  Dr. Trinidad    Assistant Surgeon:  none    Anesthesia:  General anesthetic with laryngeal mask airway  Anesthesiologist: Anesthesiologist: Parag Jarrett,   Anesthesia Assistant: Susy Lamas  Anesthesia Staff: Lucita Davison MA    Drains:  left JJ stent 7-Fr x  22 cm with pull-out-string    EBL:  5 mL    Urine Production:  Not Applicable mL    Fluid Replacement:  Per anesthesia  (UPRBC of 0)    Specimen:  Stone fragments for analysis    Complications:  None     Findings:  GENITALIA:    Normal external female genitalia. Introitus is not stenotic. Meatus is not stenotic. No urethral or vaginal discharge is noted. Estrada stage V  Rectal: Rectal exam normal. Sphincter intact with good tone. Minimal hemorrhoids.  CYSTO:  The urethral is normal in length and caliber with no evidence of ulceration, neoplasm, diverticula, or stricture. The urethra  was well supported anteriorly. The bladder shows no ulceration, neoplasms, diverticula, or calculi with clear efflux of urine from each normally placed ureteral orifice.  URETEROSCOPY:  At fluoroscopy, the left proximal renal stone now again be in the lower pole of left kidney.  Left ureter was normal & dilated easily. Stone (appeared to be calcium oxalate monohydrate, hard) identified in the left lower pole.  The angulation was difficult to visualize.  Attempt repositioned stone by basket extraction unsuccessful due to the size the stone (large, would not fit in basket).  Unable to easily visualize the stone with the laser fiber in place (secondary to decreased deflection with laser wire).  By fulgurating the edge of the stones slowly the stone reduced in size with  some fragments being chipped off the stone.  At that point I was able to engage the remaining portion of the stone in a basket and place the stone in the left renal pelvis.  The stone was then fragmented into multiple small pieces.  Individual pieces were basket extracted.  Neck final exam no additional stones of significance were noted with all calyxes being examined.  Contrast was injected in the upper track showing no extravasation.  The ureteral scope was withdrawn under direct vision showing no damage to the ureter.    A stent was placed.     Difficult modifier:  Stone location (extreme lower pole), size of the stone (large), and composition of stone (hard, appeared to be calcium oxalate monohydrate) made procedure increasingly difficult.  Working time was greater than 1 hour.    PROCEDURE:  Mohini was taken to cysto & placed under anesthesia as listed above. Surgical timeout was done with all in agreement. The patient was then positioned in the dorsal lithotomy position. The external genitalia was prepped and draped in a routine fashion. A rigid cystoscope was placed into the bladder under direct vision. Findings per above.     A 0.035 stiff straight glide wire (safety wire) was placed into the left ureteral orifice and advanced into the left renal unit. The cystoscope was removed. A 10 fr double lumina ureteral dilator was advanced over the safety wire. A 0.035 flexible glide wire (working wire) was placed. The double lumina ureteral dilator was removed.     Renal stone Left lower pole Renal stone (Cpt 592.0):  Over the working wire, a 36 cm ureteral access sheath ID 11 fr/OD 13 fr was placed.  The working wire was removed.   A flexible ureteroscope was advanced without  use of a working wire to the level of the stone.  The stone (on able to move with basket) was treated with a Holmium laser fiber to reduce the stone size.  The main portion of the stone was then engaged in a N-rody basket and moved into the  renal pelvis.  The stone was further fragmented with holmium laser lithotripsy.  Stone fragments were then engaged and removed with an N Maco basket.  All calices were examined no significant stone fragments were noted to remain.  Contrast was injected in the left upper tract and no extravasation noted.  The ureteral scope was withdrawn under direct vision in no unexpected trauma to the ureter was noted.  A JJ stent was placed over the safety wire and positioned satisfactory in the upper tract under fluoroscopic care and in the bladder under direct vision. A pull out string was placed.    The bladder was drained of urine and irrigation fluids with a cystoscope. The cystoscope was removed. Mohini was taken to the recovery area in stable condition.    PLAN:   Patient instructed to remove stent while voiding in 4 days  Drop UA in 1-2 weeks.  Will see in office in 2-6 weeks without KUB.    Rx for tamsulosin (#30), oxybutynin (#20), pyridium (#10), and trimethoprim (#10).  She is to remain on suppressive antibiotics for 10 days.    Summary:  Urinary stone disease was treated at single level (with difficulty) using manipulation, extraction, and lithotripsy as needed to relieve obstruction, alleviate symptoms, manage potential for infectious and obstructive complications.    Dictation:  # Not applicable.     normal sinus rhythm, Normal axis, Normal LA interval and QRS complex. There are no acute ischemic ST or T-wave changes.

## 2023-06-16 NOTE — ED ADULT NURSE NOTE - OBJECTIVE STATEMENT
alert and oriented x4. Pt presenting to ED from home complaining of chest pain that radiates to the neck. Pain is intermittent and associated with shortness of breath. Pt states the pain started around 4 pm when she thought her asthma was acting up. PMH CAD, 3 stents, asthma. Denies fever, chills, weakness, abdominal pain, urinary symptoms,. Speaking coherently, Respirations equal/unlabored. Placed on continuos cardiac monitoring, NSR, &  wdl on RA. IV placed, labs drawn and sent.

## 2023-06-16 NOTE — ED PROVIDER NOTE - CLINICAL SUMMARY MEDICAL DECISION MAKING FREE TEXT BOX
Patient with atypical reproducible nonexertional chest pain.  Exam notable for minimally diminished breath sounds.  Plan for labs, troponin x1, chest x-ray, duo nebs and reassess. Patient with atypical reproducible nonexertional chest pain.  Exam notable for minimally diminished breath sounds.  Plan for labs, troponin x1, chest x-ray, duo nebs and reassess.    On reassessment patient feeling better, CP free, troponin negative, CXR, WNL. Will follow up with cardiology. Strict return precautions given.

## 2023-06-16 NOTE — ED PROVIDER NOTE - ATTENDING CONTRIBUTION TO CARE
Alfredo: I performed a face to face bedside interview with patient regarding history of present illness, review of symptoms and past medical history. I completed an independent physical exam and ordered tests/medications as needed.  I have discussed patient's plan of care with the resident. The resident assisted in  executing the discussed plan. I was available for any questions or issues that may have arose during the execution of the plan of care.

## 2023-06-16 NOTE — ED PROVIDER NOTE - PHYSICAL EXAMINATION
Gen: Well appearing obese female in NAD  Head: NC/AT  Neck: trachea midline  Resp:  No distress, diffusely diminished, saturating 100% on RA  CV: RRR, +L costochondral ttp  GI: soft, NTND  Ext: no deformities  Neuro:  A&O appears non focal  Skin:  Warm and dry as visualized  Psych:  Normal affect and mood

## 2023-06-16 NOTE — ED PROVIDER NOTE - PATIENT PORTAL LINK FT
You can access the FollowMyHealth Patient Portal offered by [facility] by registering at the following website: [email or web address]. By joining [employer]’s FollowMyHealth portal, you will also be able to view your health information using other applications (apps) compatible with our system.

## 2023-06-16 NOTE — ED ADULT NURSE NOTE - CAS ELECT INFOMATION PROVIDED
DC instructions VSS, in no acute distress at time of discharge. Ambulated off unit without difficulty./DC instructions

## 2023-06-16 NOTE — ED ADULT NURSE NOTE - NSFALLHARMRISKINTERV_ED_ALL_ED

## 2023-06-16 NOTE — ED PROVIDER NOTE - NSFOLLOWUPINSTRUCTIONS_ED_ALL_ED_FT
- Follow up with your doctor within 2-3 days.   - Follow up with Cardiology.   - Bring results with you to the appointment.   - Return to the ED for any new or worsening symptoms.     Chest Pain    Chest pain can be caused by many different conditions which may or may not be dangerous. Causes include heartburn, lung infections, heart attack, blood clot in lungs, skin infections, strain or damage to muscle, cartilage, or bones, etc. In addition to a history and physical examination, an electrocardiogram (ECG) or other lab tests may have been performed to determine the cause of your chest pain. Follow up with your primary care provider or with a cardiologist as instructed.     SEEK IMMEDIATE MEDICAL CARE IF YOU HAVE ANY OF THE FOLLOWING SYMPTOMS: worsening chest pain, coughing up blood, unexplained back/neck/jaw pain, severe abdominal pain, dizziness or lightheadedness, fainting, shortness of breath, sweaty or clammy skin, vomiting, or racing heart beat. These symptoms may represent a serious problem that is an emergency. Do not wait to see if the symptoms will go away. Get medical help right away. Call 911 and do not drive yourself to the hospital.

## 2023-06-16 NOTE — ED PROVIDER NOTE - OBJECTIVE STATEMENT
48F h/o CAD s/p stents, HTN, asthma p/w CP. States was in usual state of health yesterday, today felt chest tightness in the afternoon took albuterol with some improvement. At 4PM developed L sided CP radiating to L shoulder lasting a few minutes. Does not feel as sever as when she needed stents. Denies fever, nasal congestion, nausea, vomiting, travel.

## 2023-06-23 ENCOUNTER — INPATIENT (INPATIENT)
Facility: HOSPITAL | Age: 48
LOS: 5 days | Discharge: HOME | DRG: 246 | End: 2023-06-29
Attending: STUDENT IN AN ORGANIZED HEALTH CARE EDUCATION/TRAINING PROGRAM | Admitting: INTERNAL MEDICINE
Payer: MEDICAID

## 2023-06-23 VITALS
RESPIRATION RATE: 18 BRPM | OXYGEN SATURATION: 100 % | SYSTOLIC BLOOD PRESSURE: 133 MMHG | HEART RATE: 65 BPM | DIASTOLIC BLOOD PRESSURE: 83 MMHG | HEIGHT: 62 IN

## 2023-06-23 DIAGNOSIS — I21.3 ST ELEVATION (STEMI) MYOCARDIAL INFARCTION OF UNSPECIFIED SITE: ICD-10-CM

## 2023-06-23 DIAGNOSIS — Z98.84 BARIATRIC SURGERY STATUS: Chronic | ICD-10-CM

## 2023-06-23 LAB
ALBUMIN SERPL ELPH-MCNC: 4.3 G/DL — SIGNIFICANT CHANGE UP (ref 3.3–5.2)
ALP SERPL-CCNC: 40 U/L — SIGNIFICANT CHANGE UP (ref 40–120)
ALT FLD-CCNC: 18 U/L — SIGNIFICANT CHANGE UP
ANION GAP SERPL CALC-SCNC: 15 MMOL/L — SIGNIFICANT CHANGE UP (ref 5–17)
AST SERPL-CCNC: 25 U/L — SIGNIFICANT CHANGE UP
BASOPHILS # BLD AUTO: 0.01 K/UL — SIGNIFICANT CHANGE UP (ref 0–0.2)
BASOPHILS NFR BLD AUTO: 0.1 % — SIGNIFICANT CHANGE UP (ref 0–2)
BILIRUB SERPL-MCNC: 0.4 MG/DL — SIGNIFICANT CHANGE UP (ref 0.4–2)
BUN SERPL-MCNC: 21.2 MG/DL — HIGH (ref 8–20)
CALCIUM SERPL-MCNC: 9.9 MG/DL — SIGNIFICANT CHANGE UP (ref 8.4–10.5)
CHLORIDE SERPL-SCNC: 98 MMOL/L — SIGNIFICANT CHANGE UP (ref 96–108)
CK MB CFR SERPL CALC: 4.5 NG/ML — SIGNIFICANT CHANGE UP (ref 0–6.7)
CK SERPL-CCNC: 309 U/L — HIGH (ref 25–170)
CO2 SERPL-SCNC: 21 MMOL/L — LOW (ref 22–29)
CREAT SERPL-MCNC: 1.06 MG/DL — SIGNIFICANT CHANGE UP (ref 0.5–1.3)
EGFR: 64 ML/MIN/1.73M2 — SIGNIFICANT CHANGE UP
EOSINOPHIL # BLD AUTO: 0.15 K/UL — SIGNIFICANT CHANGE UP (ref 0–0.5)
EOSINOPHIL NFR BLD AUTO: 1.8 % — SIGNIFICANT CHANGE UP (ref 0–6)
ERYTHROCYTE [SEDIMENTATION RATE] IN BLOOD: 20 MM/HR — SIGNIFICANT CHANGE UP (ref 0–20)
GLUCOSE SERPL-MCNC: 110 MG/DL — HIGH (ref 70–99)
HCG SERPL-ACNC: <4 MIU/ML — SIGNIFICANT CHANGE UP
HCT VFR BLD CALC: 37.7 % — SIGNIFICANT CHANGE UP (ref 34.5–45)
HGB BLD-MCNC: 12.8 G/DL — SIGNIFICANT CHANGE UP (ref 11.5–15.5)
IMM GRANULOCYTES NFR BLD AUTO: 0.2 % — SIGNIFICANT CHANGE UP (ref 0–0.9)
LYMPHOCYTES # BLD AUTO: 2.43 K/UL — SIGNIFICANT CHANGE UP (ref 1–3.3)
LYMPHOCYTES # BLD AUTO: 28.4 % — SIGNIFICANT CHANGE UP (ref 13–44)
MAGNESIUM SERPL-MCNC: 2.1 MG/DL — SIGNIFICANT CHANGE UP (ref 1.8–2.6)
MCHC RBC-ENTMCNC: 29.4 PG — SIGNIFICANT CHANGE UP (ref 27–34)
MCHC RBC-ENTMCNC: 34 GM/DL — SIGNIFICANT CHANGE UP (ref 32–36)
MCV RBC AUTO: 86.7 FL — SIGNIFICANT CHANGE UP (ref 80–100)
MONOCYTES # BLD AUTO: 0.59 K/UL — SIGNIFICANT CHANGE UP (ref 0–0.9)
MONOCYTES NFR BLD AUTO: 6.9 % — SIGNIFICANT CHANGE UP (ref 2–14)
NEUTROPHILS # BLD AUTO: 5.37 K/UL — SIGNIFICANT CHANGE UP (ref 1.8–7.4)
NEUTROPHILS NFR BLD AUTO: 62.6 % — SIGNIFICANT CHANGE UP (ref 43–77)
NT-PROBNP SERPL-SCNC: 84 PG/ML — SIGNIFICANT CHANGE UP (ref 0–300)
PLATELET # BLD AUTO: 374 K/UL — SIGNIFICANT CHANGE UP (ref 150–400)
POTASSIUM SERPL-MCNC: 3.7 MMOL/L — SIGNIFICANT CHANGE UP (ref 3.5–5.3)
POTASSIUM SERPL-SCNC: 3.7 MMOL/L — SIGNIFICANT CHANGE UP (ref 3.5–5.3)
PROT SERPL-MCNC: 7.7 G/DL — SIGNIFICANT CHANGE UP (ref 6.6–8.7)
RBC # BLD: 4.35 M/UL — SIGNIFICANT CHANGE UP (ref 3.8–5.2)
RBC # FLD: 15.9 % — HIGH (ref 10.3–14.5)
SODIUM SERPL-SCNC: 134 MMOL/L — LOW (ref 135–145)
TROPONIN T SERPL-MCNC: <0.01 NG/ML — SIGNIFICANT CHANGE UP (ref 0–0.06)
WBC # BLD: 8.57 K/UL — SIGNIFICANT CHANGE UP (ref 3.8–10.5)
WBC # FLD AUTO: 8.57 K/UL — SIGNIFICANT CHANGE UP (ref 3.8–10.5)

## 2023-06-23 PROCEDURE — 99291 CRITICAL CARE FIRST HOUR: CPT

## 2023-06-23 PROCEDURE — 93010 ELECTROCARDIOGRAM REPORT: CPT

## 2023-06-23 PROCEDURE — 71045 X-RAY EXAM CHEST 1 VIEW: CPT | Mod: 26

## 2023-06-23 RX ORDER — ATORVASTATIN CALCIUM 80 MG/1
40 TABLET, FILM COATED ORAL AT BEDTIME
Refills: 0 | Status: DISCONTINUED | OUTPATIENT
Start: 2023-06-23 | End: 2023-06-29

## 2023-06-23 RX ORDER — METOPROLOL TARTRATE 50 MG
50 TABLET ORAL DAILY
Refills: 0 | Status: DISCONTINUED | OUTPATIENT
Start: 2023-06-24 | End: 2023-06-29

## 2023-06-23 RX ORDER — CITALOPRAM 10 MG/1
20 TABLET, FILM COATED ORAL DAILY
Refills: 0 | Status: DISCONTINUED | OUTPATIENT
Start: 2023-06-23 | End: 2023-06-29

## 2023-06-23 RX ORDER — BUPROPION HYDROCHLORIDE 150 MG/1
150 TABLET, EXTENDED RELEASE ORAL DAILY
Refills: 0 | Status: DISCONTINUED | OUTPATIENT
Start: 2023-06-23 | End: 2023-06-29

## 2023-06-23 RX ORDER — ASPIRIN/CALCIUM CARB/MAGNESIUM 324 MG
324 TABLET ORAL ONCE
Refills: 0 | Status: COMPLETED | OUTPATIENT
Start: 2023-06-23 | End: 2023-06-23

## 2023-06-23 RX ORDER — ASPIRIN/CALCIUM CARB/MAGNESIUM 324 MG
81 TABLET ORAL DAILY
Refills: 0 | Status: DISCONTINUED | OUTPATIENT
Start: 2023-06-24 | End: 2023-06-29

## 2023-06-23 RX ORDER — CLOPIDOGREL BISULFATE 75 MG/1
75 TABLET, FILM COATED ORAL DAILY
Refills: 0 | Status: DISCONTINUED | OUTPATIENT
Start: 2023-06-24 | End: 2023-06-29

## 2023-06-23 RX ORDER — NITROGLYCERIN 6.5 MG
0.4 CAPSULE, EXTENDED RELEASE ORAL ONCE
Refills: 0 | Status: COMPLETED | OUTPATIENT
Start: 2023-06-23 | End: 2023-06-23

## 2023-06-23 RX ORDER — PANTOPRAZOLE SODIUM 20 MG/1
40 TABLET, DELAYED RELEASE ORAL
Refills: 0 | Status: DISCONTINUED | OUTPATIENT
Start: 2023-06-23 | End: 2023-06-29

## 2023-06-23 RX ORDER — HEPARIN SODIUM 5000 [USP'U]/ML
3800 INJECTION INTRAVENOUS; SUBCUTANEOUS ONCE
Refills: 0 | Status: COMPLETED | OUTPATIENT
Start: 2023-06-23 | End: 2023-06-23

## 2023-06-23 RX ORDER — NITROGLYCERIN 6.5 MG
0.4 CAPSULE, EXTENDED RELEASE ORAL ONCE
Refills: 0 | Status: DISCONTINUED | OUTPATIENT
Start: 2023-06-23 | End: 2023-06-29

## 2023-06-23 RX ORDER — HEPARIN SODIUM 5000 [USP'U]/ML
INJECTION INTRAVENOUS; SUBCUTANEOUS
Qty: 25000 | Refills: 0 | Status: DISCONTINUED | OUTPATIENT
Start: 2023-06-23 | End: 2023-06-24

## 2023-06-23 RX ORDER — LISINOPRIL 2.5 MG/1
5 TABLET ORAL DAILY
Refills: 0 | Status: DISCONTINUED | OUTPATIENT
Start: 2023-06-24 | End: 2023-06-29

## 2023-06-23 RX ORDER — HEPARIN SODIUM 5000 [USP'U]/ML
3800 INJECTION INTRAVENOUS; SUBCUTANEOUS EVERY 6 HOURS
Refills: 0 | Status: DISCONTINUED | OUTPATIENT
Start: 2023-06-23 | End: 2023-06-24

## 2023-06-23 RX ORDER — CLOPIDOGREL BISULFATE 75 MG/1
600 TABLET, FILM COATED ORAL ONCE
Refills: 0 | Status: DISCONTINUED | OUTPATIENT
Start: 2023-06-23 | End: 2023-06-23

## 2023-06-23 RX ORDER — ACETAMINOPHEN 500 MG
1000 TABLET ORAL ONCE
Refills: 0 | Status: COMPLETED | OUTPATIENT
Start: 2023-06-23 | End: 2023-06-23

## 2023-06-23 RX ADMIN — HEPARIN SODIUM 3800 UNIT(S): 5000 INJECTION INTRAVENOUS; SUBCUTANEOUS at 22:05

## 2023-06-23 RX ADMIN — Medication 0.4 MILLIGRAM(S): at 22:05

## 2023-06-23 RX ADMIN — Medication 400 MILLIGRAM(S): at 22:19

## 2023-06-23 RX ADMIN — Medication 324 MILLIGRAM(S): at 22:05

## 2023-06-23 NOTE — ED ADULT TRIAGE NOTE - CHIEF COMPLAINT QUOTE
BIBEMS for chest pain. Pt noted to be moaning in triage, saying "left side hurts, left side hurts." PMHx cardiac stents and previous MI. EMS placed pt on a NRB connected to an empty oxygen tank, NRB removed during triage. MD called to evaluate, pt brought to CC area.

## 2023-06-23 NOTE — ED ADULT NURSE NOTE - OBJECTIVE STATEMENT
Received patient in critical care bib ems with c/o of L sided chest pain. Pt has pmhx of cardiac stents and previous MI. Code stemi called overhead. Pt denies pain radiating to shoulders/arm, fever, chills, gu/gi symptoms. pt educated on plan of care, pt able to successfully teach back plan of care to RN, RN will continue to reeducate pt during hospital stay.

## 2023-06-23 NOTE — H&P CARDIOLOGY - COMMENTS
Patient with proxLAD instent restenosis and required DESx1.  Placed in ICU on ASA/Plavix, Statin, Metoprolol and Lisinopril.  Patient will require RCA Cath on Monday for significant disease.

## 2023-06-23 NOTE — ED PROVIDER NOTE - NS ED ROS FT
Constitutional: (-) fever  (-)chills  (+sweats  Eyes/ENT: (-)   Cardiovascular: (+) chest pain, (-) palpitations (-) edema   Respiratory: (-) cough, (-) shortness of breath   Gastrointestinal: (+)nausea  (-)vomiting, (-) diarrhea  (-) abdominal pain   :  (-)dysuria, (-)frequency, (-)urgency, (-)hematuria  Musculoskeletal: (-) neck pain, (-) back pain, (-) joint pain  Integumentary: (-) rash, (-) edema  Neurological: (-) headache, (-) altered mental status  (-)LOC

## 2023-06-23 NOTE — ED ADULT TRIAGE NOTE - GLASGOW COMA SCALE: BEST VERBAL RESPONSE, MLM
Patient is an 79 year old female with no significant PMH who presented with a chief complaint of fall.  Patient states that on the day of current presentation, as she was walking to her ENT appointment, patient states that she tripped and sustaining a mechanical fall on the sidewalk striking the left-side of her face upon hitting the ground.  Patient denies any loss of consciousness.    At time of examination in the ED, patient endorses some nausea and headache, particularly at the site of trauma.  However, patient denies any dizziness, confusion, chest pain, palpitations, shortness of breath, abdominal pain, vomiting/diarrhea.    T(C): 36.6 (01-20-21 @ 20:44), Max: 37.4 (01-20-21 @ 16:27)  T(F): 97.9 (01-20-21 @ 20:44), Max: 99.3 (01-20-21 @ 16:27)  HR: 85 (01-20-21 @ 20:44) (85 - 86)  BP: 130/81 (01-20-21 @ 20:44) (130/81 - 166/75)  RR: 17 (01-20-21 @ 20:44) (16 - 17)  SpO2: 96% (01-20-21 @ 20:44) (96% - 100%)  Wt(kg): --    P/E: As above MAR    A/P:    Subarachnoid Hemorrhage due to Fall:  -CT Head (at 19:14 on 1/20/2021) identified scattered areas of subarachnoid hemorrhage along both hemispheres right greater than left. No subdural or epidural hematoma.  No obvious intraparenchymal hematoma or acute infarct.  However axonal injury cannot be excluded.  -CT Head (repeat on 1/21/2021) identified scattered subarachnoid hemorrhage, right more than left, is unchanged.  -Troponin= <0.015  -Will admit patient to Telemetry  -Will send Prolactin, CK  -Will maintain euvolemia and continue to closely monitor and maintain SBP    <160mmHg; if necessary will use low-dose Labetalol IV Push PRN  -Will maintain Head of Bed elevated to at least 30-45 degrees  -Will provide supplemental oxygen with NC or FM in order to maintain SaO2 >=90%  -Tylenol Q4H PRN for fever  -Will continue to provide any and all supportive measures as necessary  -Rehab Consult for targeted therapeutic exercises  -Neuro Checks Q2H  -Fall Precautions, Aspiration Precautions, Seizure Precautions  -As per ED Attending note "discussed with trauma, dr. Spangler and will discuss with neurosurgery [Dr. Ash]...neurosurgery will come to  to eval tomorrow"  -Will need to confirm with certainty if Neurosurgery still intending to come to [facility] in order to evaluate patient.  If unable to come, will instead ask Neurology to evaluate patient for further recommendations.    Uncontrolled Blood Glucose:  -Hemoglobin A1c with AM labs  -Blood Glucose Monitoring ACHS  -Regular Insulin Sliding Scale ACHS  -NPO (except meds), for now until Speech and Swallow evaluation    Nausea:  -Zofran PRN for nausea    GI/DVT PPx:  -Intermittent Compression Stockings  -Pepcid Patient is an 79 year old female with no significant PMH who presented with a chief complaint of fall.  Patient states that on the day of current presentation, as she was walking to her ENT appointment, patient states that she tripped and sustaining a mechanical fall on the sidewalk striking the left-side of her face upon hitting the ground.  Patient denies any loss of consciousness.    At time of examination in the ED, patient endorses some nausea and headache, particularly at the site of trauma.  However, patient denies any dizziness, confusion, chest pain, palpitations, shortness of breath, abdominal pain, vomiting/diarrhea.    T(C): 36.6 (01-20-21 @ 20:44), Max: 37.4 (01-20-21 @ 16:27)  T(F): 97.9 (01-20-21 @ 20:44), Max: 99.3 (01-20-21 @ 16:27)  HR: 85 (01-20-21 @ 20:44) (85 - 86)  BP: 130/81 (01-20-21 @ 20:44) (130/81 - 166/75)  RR: 17 (01-20-21 @ 20:44) (16 - 17)  SpO2: 96% (01-20-21 @ 20:44) (96% - 100%)  Wt(kg): --    P/E: As above MAR    A/P:    Subarachnoid Hemorrhage due to Fall:  -CT Head (at 19:14 on 1/20/2021) identified scattered areas of subarachnoid hemorrhage along both hemispheres right greater than left. No subdural or epidural hematoma.  No obvious intraparenchymal hematoma or acute infarct.  However axonal injury cannot be excluded.  -CT Head (repeat on 1/21/2021) identified scattered subarachnoid hemorrhage, right more than left, is unchanged.  -Troponin= <0.015  -Will admit patient to Telemetry  -Will send Prolactin, CK  -Will maintain euvolemia and continue to closely monitor and maintain SBP<160mmHg; if necessary will use low-dose Labetalol IV Push PRN  -Will maintain Head of Bed elevated to at least 30-45 degrees  -Will provide supplemental oxygen with NC or FM in order to maintain SaO2 >=90%  -Tylenol Q4H PRN for fever  -Will continue to provide any and all supportive measures as necessary  -Rehab Consult for targeted therapeutic exercises  -Neuro Checks Q2H  -Fall Precautions, Aspiration Precautions, Seizure Precautions  -As per ED Attending note "discussed with trauma, dr. Spangler and will discuss with neurosurgery [Dr. Ash]...neurosurgery will come to  to eval tomorrow"  -Will need to confirm with certainty if Neurosurgery still intending to come to [facility] in order to evaluate patient.  If unable to come, will instead ask Neurology to evaluate patient for further recommendations.    Uncontrolled Blood Glucose:  -Hemoglobin A1c with AM labs  -Blood Glucose Monitoring ACHS  -Regular Insulin Sliding Scale ACHS  -NPO (except meds), for now until Speech and Swallow evaluation    Nausea:  -Zofran PRN for nausea    GI/DVT PPx:  -Intermittent Compression Stockings  -Pepcid (V5) oriented

## 2023-06-23 NOTE — ED ADULT NURSE NOTE - NSFALLHARMRISKINTERV_ED_ALL_ED

## 2023-06-23 NOTE — ED PROVIDER NOTE - CLINICAL SUMMARY MEDICAL DECISION MAKING FREE TEXT BOX
48yoF; with PMH signif for CAD s/p 5 stents 1 month ago, hx gastric bypass, HTN, nicotine dependence, Pericarditis; now p/w chest pain--left sided chest pain, radiating to left arm and left neck, associated with nausea, diaphoresis, sob, dizziness, lasting 30min, worse with laying flat.   Initial EKG with no st elevation, chest pain improved with nitro.  chest pain recurred and repeat EKG showed STEMI.  STEMI code activated, Dr. Ramirez notifed and en route for cath.  Family notified.

## 2023-06-23 NOTE — H&P CARDIOLOGY - NSICDXPASTMEDICALHX_GEN_ALL_CORE_FT
PAST MEDICAL HISTORY:  CAD (coronary artery disease) asthma    H/O pericarditis     HTN (hypertension)     Nicotine dependence     Obesity

## 2023-06-23 NOTE — ED PROVIDER NOTE - OBJECTIVE STATEMENT
48yoF; with PMH signif for CAD s/p 5 stents 1 month ago, hx gastric bypass, HTN, nicotine dependence, Pericarditis; now p/w chest pain--left sided chest pain, radiating to left arm and left neck, associated with nausea, diaphoresis, sob, dizziness, lasting 30min, worse with laying flat.  denies f/c/s. denies recent illness. denies cough.  denies back pain.  denies numbness/tingling. denies focal weakness.  PMH:  CAD, HTN, Nicotine Dependence, Morbid Obesity s/p gastric bypass, Pericarditits  SOCIAL: +smoking

## 2023-06-23 NOTE — H&P CARDIOLOGY - HISTORY OF PRESENT ILLNESS
Patient is a 47yo F w/ PMHx CAD s/p 5 stents last year in [place], gastric bypass, HTN, nicotine dependence, pericarditis presents from home w/ acute onset of chest pain, left sided, sharp and 9/10 in severity.  Radiating to left arm and left neck, associated with nausea, diaphoresis, dyspnea, dizziness, lasting 30min, worse with laying flat.  Denies fever, chills, recent illness, cough or back pain.  Further denies numbness or tingling or focal weakness.

## 2023-06-24 LAB
ANION GAP SERPL CALC-SCNC: 11 MMOL/L — SIGNIFICANT CHANGE UP (ref 5–17)
BASOPHILS # BLD AUTO: 0.02 K/UL — SIGNIFICANT CHANGE UP (ref 0–0.2)
BASOPHILS NFR BLD AUTO: 0.2 % — SIGNIFICANT CHANGE UP (ref 0–2)
CHLORIDE SERPL-SCNC: 101 MMOL/L — SIGNIFICANT CHANGE UP (ref 96–108)
CHOLEST SERPL-MCNC: 151 MG/DL — SIGNIFICANT CHANGE UP
CO2 SERPL-SCNC: 21 MMOL/L — LOW (ref 22–29)
EOSINOPHIL # BLD AUTO: 0.1 K/UL — SIGNIFICANT CHANGE UP (ref 0–0.5)
EOSINOPHIL NFR BLD AUTO: 1.2 % — SIGNIFICANT CHANGE UP (ref 0–6)
HCT VFR BLD CALC: 33.9 % — LOW (ref 34.5–45)
HDLC SERPL-MCNC: 56 MG/DL — SIGNIFICANT CHANGE UP
HGB BLD-MCNC: 11.4 G/DL — LOW (ref 11.5–15.5)
IMM GRANULOCYTES NFR BLD AUTO: 0.4 % — SIGNIFICANT CHANGE UP (ref 0–0.9)
LIPID PNL WITH DIRECT LDL SERPL: 80 MG/DL — SIGNIFICANT CHANGE UP
LYMPHOCYTES # BLD AUTO: 1.93 K/UL — SIGNIFICANT CHANGE UP (ref 1–3.3)
LYMPHOCYTES # BLD AUTO: 22.6 % — SIGNIFICANT CHANGE UP (ref 13–44)
MCHC RBC-ENTMCNC: 29.1 PG — SIGNIFICANT CHANGE UP (ref 27–34)
MCHC RBC-ENTMCNC: 33.6 GM/DL — SIGNIFICANT CHANGE UP (ref 32–36)
MCV RBC AUTO: 86.5 FL — SIGNIFICANT CHANGE UP (ref 80–100)
MONOCYTES # BLD AUTO: 0.54 K/UL — SIGNIFICANT CHANGE UP (ref 0–0.9)
MONOCYTES NFR BLD AUTO: 6.3 % — SIGNIFICANT CHANGE UP (ref 2–14)
MRSA PCR RESULT.: SIGNIFICANT CHANGE UP
NEUTROPHILS # BLD AUTO: 5.93 K/UL — SIGNIFICANT CHANGE UP (ref 1.8–7.4)
NEUTROPHILS NFR BLD AUTO: 69.3 % — SIGNIFICANT CHANGE UP (ref 43–77)
NON HDL CHOLESTEROL: 95 MG/DL — SIGNIFICANT CHANGE UP
PLATELET # BLD AUTO: 311 K/UL — SIGNIFICANT CHANGE UP (ref 150–400)
POTASSIUM SERPL-MCNC: 4.7 MMOL/L — SIGNIFICANT CHANGE UP (ref 3.5–5.3)
POTASSIUM SERPL-SCNC: 4.7 MMOL/L — SIGNIFICANT CHANGE UP (ref 3.5–5.3)
RBC # BLD: 3.92 M/UL — SIGNIFICANT CHANGE UP (ref 3.8–5.2)
RBC # FLD: 15.9 % — HIGH (ref 10.3–14.5)
S AUREUS DNA NOSE QL NAA+PROBE: SIGNIFICANT CHANGE UP
SODIUM SERPL-SCNC: 133 MMOL/L — LOW (ref 135–145)
TRIGL SERPL-MCNC: 73 MG/DL — SIGNIFICANT CHANGE UP
WBC # BLD: 8.55 K/UL — SIGNIFICANT CHANGE UP (ref 3.8–10.5)
WBC # FLD AUTO: 8.55 K/UL — SIGNIFICANT CHANGE UP (ref 3.8–10.5)

## 2023-06-24 PROCEDURE — 93306 TTE W/DOPPLER COMPLETE: CPT | Mod: 26

## 2023-06-24 PROCEDURE — 93010 ELECTROCARDIOGRAM REPORT: CPT

## 2023-06-24 PROCEDURE — 99233 SBSQ HOSP IP/OBS HIGH 50: CPT

## 2023-06-24 RX ORDER — SODIUM CHLORIDE 9 MG/ML
3 INJECTION INTRAMUSCULAR; INTRAVENOUS; SUBCUTANEOUS EVERY 8 HOURS
Refills: 0 | Status: DISCONTINUED | OUTPATIENT
Start: 2023-06-24 | End: 2023-06-29

## 2023-06-24 RX ORDER — ACETAMINOPHEN 500 MG
650 TABLET ORAL EVERY 6 HOURS
Refills: 0 | Status: DISCONTINUED | OUTPATIENT
Start: 2023-06-24 | End: 2023-06-29

## 2023-06-24 RX ORDER — HEPARIN SODIUM 5000 [USP'U]/ML
5000 INJECTION INTRAVENOUS; SUBCUTANEOUS EVERY 12 HOURS
Refills: 0 | Status: DISCONTINUED | OUTPATIENT
Start: 2023-06-24 | End: 2023-06-29

## 2023-06-24 RX ORDER — ACETAMINOPHEN 500 MG
1000 TABLET ORAL ONCE
Refills: 0 | Status: COMPLETED | OUTPATIENT
Start: 2023-06-24 | End: 2023-06-24

## 2023-06-24 RX ADMIN — SODIUM CHLORIDE 3 MILLILITER(S): 9 INJECTION INTRAMUSCULAR; INTRAVENOUS; SUBCUTANEOUS at 05:21

## 2023-06-24 RX ADMIN — BUPROPION HYDROCHLORIDE 150 MILLIGRAM(S): 150 TABLET, EXTENDED RELEASE ORAL at 11:52

## 2023-06-24 RX ADMIN — LISINOPRIL 5 MILLIGRAM(S): 2.5 TABLET ORAL at 05:18

## 2023-06-24 RX ADMIN — Medication 650 MILLIGRAM(S): at 12:49

## 2023-06-24 RX ADMIN — PANTOPRAZOLE SODIUM 40 MILLIGRAM(S): 20 TABLET, DELAYED RELEASE ORAL at 05:16

## 2023-06-24 RX ADMIN — CITALOPRAM 20 MILLIGRAM(S): 10 TABLET, FILM COATED ORAL at 11:52

## 2023-06-24 RX ADMIN — Medication 650 MILLIGRAM(S): at 13:49

## 2023-06-24 RX ADMIN — Medication 400 MILLIGRAM(S): at 00:35

## 2023-06-24 RX ADMIN — ATORVASTATIN CALCIUM 40 MILLIGRAM(S): 80 TABLET, FILM COATED ORAL at 21:52

## 2023-06-24 RX ADMIN — Medication 50 MILLIGRAM(S): at 05:18

## 2023-06-24 RX ADMIN — CLOPIDOGREL BISULFATE 75 MILLIGRAM(S): 75 TABLET, FILM COATED ORAL at 11:52

## 2023-06-24 RX ADMIN — Medication 81 MILLIGRAM(S): at 11:52

## 2023-06-24 RX ADMIN — SODIUM CHLORIDE 3 MILLILITER(S): 9 INJECTION INTRAMUSCULAR; INTRAVENOUS; SUBCUTANEOUS at 14:03

## 2023-06-24 NOTE — CONSULT NOTE ADULT - SUBJECTIVE AND OBJECTIVE BOX
Patient is a 48y old  Female who presents with a chief complaint of acute onset of sharp left sided chest pain      HPI:  Patient is a 49yo F w/ PMHx CAD s/p 5 stents last year in [place], gastric bypass, HTN, nicotine dependence, pericarditis, who presents from home w/ acute onset of chest pain, left sided, sharp and 9/10 in severity.  Radiating to left arm and left neck, associated with nausea, diaphoresis, dyspnea, dizziness, lasting 30min, worse with laying flat.  Denies fever, chills, recent illness, cough or back pain.   ECG shows STEMI Anterior leads, Code STEMI activated and Pt taken emergently to Cardiac Cath lab.          PAST MEDICAL & SURGICAL HISTORY:  CAD (coronary artery disease)  asthma  H/O pericarditis  Nicotine dependence  HTN (hypertension)  Obesity  H/O gastric bypass            Allergies  [place] (Anaphylaxis)  penicillin (Unknown)            MEDICATIONS  (HOME):    · 	ergocalciferol 1.25 mg (50,000 intl units) oral capsule: 1 cap(s) orally once a week   · 	nicotine 7 mg/24 hr transdermal film, extended release: 1 patch transdermal once a day   · 	colchicine 0.6 mg oral tablet: 1 tab(s) orally 2 times a day  · 	Tylenol 8 Hour 650 mg oral tablet, extended release: 1 tab(s) orally every 8 hours, As Needed -for mild pain   · 	metoprolol succinate 25 mg oral tablet, extended release: 1 tab(s) orally 2 times a day  · 	Aspir 81 oral delayed release tablet: 1 tab(s) orally once a day  · 	Wellbutrin  mg/24 hours oral tablet, extended release: 1 tab(s) orally every 24 hours  · 	doxepin 50 mg oral capsule: 1 cap(s) orally once a day (at bedtime)  · 	Albuterol (Eqv-ProAir HFA) 90 mcg/inh inhalation aerosol: 2 puff(s) inhaled every 4 hours, As Needed - for shortness of breath and/or wheezing  · 	triamterene-hydrochlorothiazide 37.5 mg-25 mg oral tablet: 1 tab(s) orally once a day  · 	atorvastatin 40 mg oral tablet: 1 tab(s) orally once a day (at bedtime)  · 	citalopram 20 mg oral tablet: 1 tab(s) orally once a day  · 	prasugrel 10 mg oral tablet: 1 tab(s) orally once a day        CIGARETTES: ACTIVE SMOKER    ALCOHOL: OCCASIONALLY     REVIEW OF SYSTEMS:  CONSTITUTIONAL: No fever, weight loss, or fatigue  EYES: No eye pain, visual disturbances, or discharge  ENMT:  No difficulty hearing, tinnitus, vertigo; No sinus or throat pain  NECK: No pain or stiffness  RESPIRATORY: No cough, wheezing, chills or hemoptysis; No Shortness of Breath  CARDIOVASCULAR: + chest pain and dizziness, No palpitations, passing out, or leg swelling  GASTROINTESTINAL: No abdominal or epigastric pain. + nausea, No vomiting, or hematemesis; No diarrhea or constipation. No melena or hematochezia.  GENITOURINARY: No dysuria, frequency, hematuria, or incontinence  NEUROLOGICAL: No headaches, memory loss, loss of strength, numbness, or tremors  SKIN: No itching, burning, rashes, or lesions   ENDOCRINE: No heat or cold intolerance; No hair loss  MUSCULOSKELETAL: No joint pain or swelling; No muscle, back, or extremity pain  PSYCHIATRIC: No depression, anxiety, mood swings, or difficulty sleeping  HEME/LYMPH: No easy bruising, or bleeding gums  ALLERY AND IMMUNOLOGIC: No hives or eczema	    Vital Signs Last 24 Hrs  T(C): 36.7 (26 Jun 2023 10:57), Max: 36.8 (25 Jun 2023 17:20)  T(F): 98.1 (26 Jun 2023 10:57), Max: 98.3 (25 Jun 2023 17:20)  HR: 59 (26 Jun 2023 10:57) (59 - 71)  BP: 105/64 (26 Jun 2023 10:57) (105/64 - 139/80)  BP(mean): --  RR: 18 (26 Jun 2023 10:57) (17 - 20)  SpO2: 97% (26 Jun 2023 10:57) (97% - 99%)    Parameters below as of 26 Jun 2023 10:57  Patient On (Oxygen Delivery Method): room air        PHYSICAL EXAM:  Appearance: diaphoretic with left sided chest pain 	  HEENT:   Normal oral mucosa, PERRL, EOMI, sclera non-icteric	  Cardiovascular: Normal S1 S2, No JVD, No cardiac murmurs, No carotid bruits, No peripheral edema  Respiratory: Lungs clear to auscultation	  Psychiatry: A & O x 3, Mood & affect appropriate  Gastrointestinal:  Soft, Non-tender, + BS, no bruits	  Skin: No rashes, No ecchymoses, No cyanosis  Neurologic: Grossly non-focal with full strength in all four extremities  Extremities: Normal range of motion, No clubbing, cyanosis or edema  Vascular: Peripheral pulses palpable bilaterally        ECG: Sinus Rhythm  ACUTE STEMI Anterior             LABS:                        11.7   6.28  )-----------( 319      ( 26 Jun 2023 03:02 )             36.1     06-26    134<L>  |  100  |  18.4  ----------------------------<  83  4.4   |  22.0  |  0.98    Ca    8.9      26 Jun 2023 03:02  Phos  3.5     06-25  Mg     1.9     06-26    TPro  6.8  /  Alb  3.3  /  TBili  0.3<L>  /  DBili  x   /  AST  18  /  ALT  13  /  AlkPhos  33<L>  06-25          Urinalysis Basic - ( 26 Jun 2023 03:02 )    Color: x / Appearance: x / SG: x / pH: x  Gluc: 83 mg/dL / Ketone: x  / Bili: x / Urobili: x   Blood: x / Protein: x / Nitrite: x   Leuk Esterase: x / RBC: x / WBC x   Sq Epi: x / Non Sq Epi: x / Bacteria: x    Assessment:  HPI:  Patient is a 49yo F w/ PMHx CAD s/p 5 stents last year in [place], gastric bypass, HTN, nicotine dependence, pericarditis, who presents from home w/ acute onset of chest pain, left sided, sharp and 9/10 in severity.  Radiating to left arm and left neck, associated with nausea, diaphoresis, dyspnea, dizziness, lasting 30min, worse with laying flat.  Denies fever, chills, recent illness, cough or back pain.   ECG shows Sinus Rhythm STEMI Anterior leads, Code STEMI activated and Pt taken emergently to Cardiac Cath lab.    Plan:  Left heat cardiac catheterization and possible percutaneous intervention recommended.  Risks, benefits, and alternatives reviewed.  Risks including but not limited to MI, death, stroke, bleeding, infection, vessel injury, hematoma, renal failure, allergic reaction, urgent open heart surgery, restenosis and stent thrombosis were reviewed.  All questions answered.  Patient is agreeable to proceed. 
  48y  Female  [place] (Anaphylaxis)  penicillin (Unknown)    CC; Patient is a 48y old  Female who presents with a chief complaint of chest pain   HPI: 48 year old female with PMHx CAD s/p 5 stents last year in [place], gastric bypass, HTN, nicotine dependence, pericarditis who presented to ER from home complaining of sudden onset chest pain that radiated towrads the left, associated with nausea, diaphoresis, dyspnea, dizziness and described sharp and 9 out of 10 in severity.  The pain 30min, worse with laying flat.  She denied any other history including  fever, chills, recent illness, cough or back pain.  active chest pain with ST elevations , Code STEMI was called in the ER and she was taken to the cath lab. Post procedure chest pain free and hemodynamically stable        PAST MEDICAL & SURGICAL HISTORY:  CAD (coronary artery disease)  asthma  H/O pericarditis  icotine dependence  HTN (hypertension)  Obesity  H/O gastric bypass    Vital Signs Last 24 Hrs  T(C): 36.6 (24 Jun 2023 04:21), Max: 36.6 (24 Jun 2023 00:00)  T(F): 97.9 (24 Jun 2023 04:21), Max: 97.9 (24 Jun 2023 04:21)  HR: 61 (24 Jun 2023 04:00) (61 - 91)  BP: 107/74 (24 Jun 2023 04:00) (107/74 - 155/88)  BP(mean): 85 (24 Jun 2023 04:00) (85 - 112)  RR: 12 (24 Jun 2023 04:00) (9 - 23)  SpO2: 98% (24 Jun 2023 04:00) (95% - 100%)    Parameters below as of 24 Jun 2023 04:00  Patient On (Oxygen Delivery Method): nasal cannula    O2 Concentration (%): 2    I&O's Summary  23 Jun 2023 07:01  -  24 Jun 2023 05:26  --------------------------------------------------------  IN: 100 mL / OUT: 600 mL / NET: -500 mL      LABS  06-24  133<L>  |  101  |  x   ----------------------------<  x   4.7   |  21.0<L>  |  x   Ca    9.9      23 Jun 2023 21:44  Mg     2.1     06-23  TPro  7.7  /  Alb  4.3  /  TBili  0.4  /  DBili  x   /  AST  25  /  ALT  18  /  AlkPhos  40  06-23                        11.4   8.55  )-----------( 311      ( 24 Jun 2023 02:55 )             33.9   CARDIAC MARKERS ( 23 Jun 2023 21:44 )  x     / <0.01 ng/mL / 309 U/L / x     / 4.5 ng/mL      LIVER FUNCTIONS - ( 23 Jun 2023 21:44 )  Alb: 4.3 g/dL / Pro: 7.7 g/dL / ALK PHOS: 40 U/L / ALT: 18 U/L / AST: 25 U/L / GGT: x           Urinalysis Basic - ( 23 Jun 2023 21:44 )  Color: x / Appearance: x / SG: x / pH: x  Gluc: 110 mg/dL / Ketone: x  / Bili: x / Urobili: x   Blood: x / Protein: x / Nitrite: x   Leuk Esterase: x / RBC: x / WBC x   Sq Epi: x / Non Sq Epi: x / Bacteria: x    MEDICATIONS  (STANDING):  aspirin enteric coated 81 milliGRAM(s) Oral daily  atorvastatin 40 milliGRAM(s) Oral at bedtime  buPROPion XL (24-Hour) . 150 milliGRAM(s) Oral daily  citalopram 20 milliGRAM(s) Oral daily  clopidogrel Tablet 75 milliGRAM(s) Oral daily  lisinopril 5 milliGRAM(s) Oral daily  metoprolol succinate ER 50 milliGRAM(s) Oral daily  nitroglycerin     SubLingual 0.4 milliGRAM(s) SubLingual Once  pantoprazole    Tablet 40 milliGRAM(s) Oral before breakfast  sodium chloride 0.9% lock flush 3 milliLiter(s) IV Push every 8 hours      REVIEW OF SYSTEMS:    CONSTITUTIONAL: No fever, weight loss, or fatigue  EYES: No eye pain, visual disturbances, or discharge  ENMT:  No difficulty hearing, tinnitus, vertigo; No sinus or throat pain  NECK: No pain or stiffness  BREASTS: No pain, masses, or nipple discharge  RESPIRATORY: No cough, wheezing, chills or hemoptysis; No shortness of breath  CARDIOVASCULAR: No chest pain, palpitations, dizziness, or leg swelling  GASTROINTESTINAL: No abdominal or epigastric pain. No nausea, vomiting, or hematemesis; No diarrhea or constipation. No melena or hematochezia.  GENITOURINARY: No dysuria, frequency, hematuria, or incontinence  NEUROLOGICAL: No headaches, memory loss, loss of strength, numbness, or tremors  SKIN: No itching, burning, rashes, or lesions   LYMPH NODES: No enlarged glands  ENDOCRINE: No heat or cold intolerance; No hair loss  MUSCULOSKELETAL: No joint pain or swelling; No muscle, back, or extremity pain  PSYCHIATRIC: No depression, anxiety, mood swings, or difficulty sleeping  HEME/LYMPH: No easy bruising, or bleeding gums  ALLERY AND IMMUNOLOGIC: No hives or eczema      Physicial Exam:     Constitutional: NAD, well-groomed, well-developed  HEENT: PERRLA, EOMI, no drainage or redness  Neck: No bruits; no thyromegaly or nodules,  No JVD  Back: Normal spine flexure, No CVA tenderness, No deformity or limitation of movement  Respiratory: Breath Sounds equal & clear to percussion & auscultation, no accessory muscle use  Cardiovascular: Regular rate & rhythm, normal S1, S2; no murmurs, gallops or rubs; no S3, S4  Gastrointestinal: Soft, non-tender, non distended no hepatosplenomegaly, normal bowel sounds  Extremities: No peripheral edema, No cyanosis, clubbing   Vascular: Equal and normal pulses: 2+ peripheral pulses throughout  Neurological: GCS:    A&O x 3; no sensory, motor  deficits, normal reflexes  Psychiatric: Normal mood, normal affect  Musculoskeletal: No joint pain, swelling or deformity; no limitation of movement  Skin: No rashes

## 2023-06-24 NOTE — PROVIDER CONTACT NOTE (OTHER) - SITUATION
micu pa aware of patient only having 1 IV. After failed attempts by this RN and other RNs on unit, the patient only has 1 working IV. micu pa and md aware

## 2023-06-24 NOTE — PROGRESS NOTE ADULT - ASSESSMENT
Pericarditis. Post MI Dressler's syndrome  CAD, s/p PCI 5 weeks ago for MI  Left groin hematoma  Smoking    Suggest:    NSAIDs and colchicine  Conservative treatment for left groin hematoma.  Area is soft, compressible, no bruit.  Ultrasound is negative for pseudoaneurysm.  No vascular or neurological compromise noted.  Warm compresses advised.  Follow-up in 1 to 2 weeks.  Discharge planning  Diet and lifestyle modifications suggested.  Increase exercise.  Smoking cessation is advised. Pt was educated about the risks of smoking. Pt was advised about the options available for smoking cessation.   I shall f/u PRN now.  44 year old AA female with known CAD with known CAD s/p 5 stents last year in [place], gastric bypass, HTN, nicotine dependence, pericarditis  admitted 06/23/2023 with   1. Chest pain  2. Anterior STEMI  3. s/p cath (06/23/2023) revealed  90 % occlusion of LAD s/p 1 PCI, Restenosis of RCA, planned LHC stage PCI on monday for RCA lesion  4. s/p echo (06/24/2023) EF 45 - 50%, The mid anteroseptal segment and basal inferoseptal segment are hypokinetic. Mild MR. Mild TR.       Plan:  Continue ASA/ Plavix/ BB/ ACEI/ Statin  Restenosis of RCA, planned LHC stage PCI on monday for RCA lesion by Dr. Modi  Maintain MAP >65. Maintaining o2 sat > 92 % on room air.    Repeat EKG.   Case discussed with PA in ICU  Dr. Modi to resume care Monday AM

## 2023-06-24 NOTE — PATIENT PROFILE ADULT - NSPROSPHOSPCHAPLAINYN_GEN_A_NUR
Bed: 33  Expected date:   Expected time:   Means of arrival:   Comments:  CFELOISA 67F SOB  
Pt jessica garcia updated on POC with pt permission.   
Shanique from [facility] aware chart is ready for review.   
no

## 2023-06-24 NOTE — PROGRESS NOTE ADULT - ASSESSMENT
49 y/o F w/ pMH of CAD s/p PCI 05/2023 w/ 5 stents, HTN, HLD came in for cp and found to have AMI; code stemi called and pt went for urgent cath w/ subsequent YASSINE x1 placed to prox LAD.  pt noted to have stenosis of one of her former stents and will go for staged PCI on Monday        STEMI s/p 1 YASSINE to LAD  - Hx of 5 stents last year   - c/w ASA and plavix  - Stat utox ordered   - c/w BB, ACE and statin   - Will go for staged PCI monday for RCA lesion  - Monitor on telemetry   - CArdio following and recs noted       HFrEF  - LVEF 45% on TTE   - Clinically euvolemic   - Monitor daily weights and i/o's  - May benefit from SGLT2I prior to d/c       Nicotine dependence  - counseled on abstaining from tobacco use       VTE ppx: will place on heparin sq    Dispo: remains acute

## 2023-06-24 NOTE — CONSULT NOTE ADULT - NS PANP COMMENT GEN_ALL_CORE FT
49 y/o F with hx of CAD s/p 5 stents last year in [place], gastric bypass, HTN, nicotine dependence, pericarditis who presented to ER for sudden chest pain.  Patient was in [facility] ED 6/15-16 for similar complaint, but trop negative, and patient clinically improves, thus was d/c from ED.  Patient was found to have active chest pain with ST elevation, STEMI activated.  Found to have 90% stenosis at prox LAD, s/p 1 stent placed.  Also found to have stent re-stenosis at RCA.  Patient clinically improves, hemodynamically stable.  Admit to MICU for post-cath observation.  Pending repeat LHC on monday for RCA lesion.   Continue ASA/plavix, statin, start on metoprolol and lisinopril.  Rest of recs per cardiology.   If remains stable, likely can downgrade.

## 2023-06-24 NOTE — CONSULT NOTE ADULT - NSCONSULTADDITIONALINFOA_GEN_ALL_CORE
Time spent incuded assessed presenting problems of acute illness that poses probability  end organ damage/dysfunction.  Medical decision making inculding plan of daily care, reviewing data, reviewing radiology, discussing with multidisciplinary team, non inclusive of procedures, discussing goals of care with patient/family

## 2023-06-24 NOTE — PATIENT PROFILE ADULT - FALL HARM RISK - HARM RISK INTERVENTIONS

## 2023-06-24 NOTE — CONSULT NOTE ADULT - ASSESSMENT
48 robyn old female presented to ER as code STEMI now s/p proximal LAD DESx1.     - monitor in ICU overnight   -ASA/Plavix   - Statin   -Metoprolol  - Lisinopril   - Plan for RCA Cath on monday for significant disease   - per cardilogy hold heparin   - Morphine PRN  -Nitro sublingual 0.4 mgSL Q5min PRN for chest pain, stop after 3 doses do not exceed 3 doses in 15min   -TTE   - protonix for GI while NPO  -aspiration precautions by keeping head of bed at 30 degrees   - Antidepressant = Celexa and welbutrin

## 2023-06-25 LAB
ALBUMIN SERPL ELPH-MCNC: 3.3 G/DL — SIGNIFICANT CHANGE UP (ref 3.3–5.2)
ALP SERPL-CCNC: 33 U/L — LOW (ref 40–120)
ALT FLD-CCNC: 13 U/L — SIGNIFICANT CHANGE UP
AMPHET UR-MCNC: POSITIVE
ANION GAP SERPL CALC-SCNC: 9 MMOL/L — SIGNIFICANT CHANGE UP (ref 5–17)
AST SERPL-CCNC: 18 U/L — SIGNIFICANT CHANGE UP
BARBITURATES UR SCN-MCNC: NEGATIVE — SIGNIFICANT CHANGE UP
BENZODIAZ UR-MCNC: POSITIVE
BILIRUB SERPL-MCNC: 0.3 MG/DL — LOW (ref 0.4–2)
BUN SERPL-MCNC: 14 MG/DL — SIGNIFICANT CHANGE UP (ref 8–20)
CALCIUM SERPL-MCNC: 8.8 MG/DL — SIGNIFICANT CHANGE UP (ref 8.4–10.5)
CHLORIDE SERPL-SCNC: 103 MMOL/L — SIGNIFICANT CHANGE UP (ref 96–108)
CO2 SERPL-SCNC: 24 MMOL/L — SIGNIFICANT CHANGE UP (ref 22–29)
COCAINE METAB.OTHER UR-MCNC: NEGATIVE — SIGNIFICANT CHANGE UP
CREAT SERPL-MCNC: 0.87 MG/DL — SIGNIFICANT CHANGE UP (ref 0.5–1.3)
EGFR: 82 ML/MIN/1.73M2 — SIGNIFICANT CHANGE UP
GLUCOSE SERPL-MCNC: 93 MG/DL — SIGNIFICANT CHANGE UP (ref 70–99)
HCT VFR BLD CALC: 35.5 % — SIGNIFICANT CHANGE UP (ref 34.5–45)
HGB BLD-MCNC: 11.6 G/DL — SIGNIFICANT CHANGE UP (ref 11.5–15.5)
MAGNESIUM SERPL-MCNC: 2 MG/DL — SIGNIFICANT CHANGE UP (ref 1.6–2.6)
MCHC RBC-ENTMCNC: 28.9 PG — SIGNIFICANT CHANGE UP (ref 27–34)
MCHC RBC-ENTMCNC: 32.7 GM/DL — SIGNIFICANT CHANGE UP (ref 32–36)
MCV RBC AUTO: 88.3 FL — SIGNIFICANT CHANGE UP (ref 80–100)
METHADONE UR-MCNC: NEGATIVE — SIGNIFICANT CHANGE UP
OPIATES UR-MCNC: NEGATIVE — SIGNIFICANT CHANGE UP
PCP SPEC-MCNC: SIGNIFICANT CHANGE UP
PCP UR-MCNC: NEGATIVE — SIGNIFICANT CHANGE UP
PHOSPHATE SERPL-MCNC: 3.5 MG/DL — SIGNIFICANT CHANGE UP (ref 2.4–4.7)
PLATELET # BLD AUTO: 327 K/UL — SIGNIFICANT CHANGE UP (ref 150–400)
POTASSIUM SERPL-MCNC: 4.5 MMOL/L — SIGNIFICANT CHANGE UP (ref 3.5–5.3)
POTASSIUM SERPL-SCNC: 4.5 MMOL/L — SIGNIFICANT CHANGE UP (ref 3.5–5.3)
PROT SERPL-MCNC: 6.8 G/DL — SIGNIFICANT CHANGE UP (ref 6.6–8.7)
RBC # BLD: 4.02 M/UL — SIGNIFICANT CHANGE UP (ref 3.8–5.2)
RBC # FLD: 15.9 % — HIGH (ref 10.3–14.5)
SODIUM SERPL-SCNC: 136 MMOL/L — SIGNIFICANT CHANGE UP (ref 135–145)
THC UR QL: NEGATIVE — SIGNIFICANT CHANGE UP
WBC # BLD: 6.21 K/UL — SIGNIFICANT CHANGE UP (ref 3.8–10.5)
WBC # FLD AUTO: 6.21 K/UL — SIGNIFICANT CHANGE UP (ref 3.8–10.5)

## 2023-06-25 PROCEDURE — 99232 SBSQ HOSP IP/OBS MODERATE 35: CPT

## 2023-06-25 RX ADMIN — HEPARIN SODIUM 5000 UNIT(S): 5000 INJECTION INTRAVENOUS; SUBCUTANEOUS at 05:25

## 2023-06-25 RX ADMIN — BUPROPION HYDROCHLORIDE 150 MILLIGRAM(S): 150 TABLET, EXTENDED RELEASE ORAL at 11:26

## 2023-06-25 RX ADMIN — LISINOPRIL 5 MILLIGRAM(S): 2.5 TABLET ORAL at 05:25

## 2023-06-25 RX ADMIN — Medication 81 MILLIGRAM(S): at 11:26

## 2023-06-25 RX ADMIN — CITALOPRAM 20 MILLIGRAM(S): 10 TABLET, FILM COATED ORAL at 11:26

## 2023-06-25 RX ADMIN — Medication 50 MILLIGRAM(S): at 05:25

## 2023-06-25 RX ADMIN — ATORVASTATIN CALCIUM 40 MILLIGRAM(S): 80 TABLET, FILM COATED ORAL at 21:31

## 2023-06-25 RX ADMIN — CLOPIDOGREL BISULFATE 75 MILLIGRAM(S): 75 TABLET, FILM COATED ORAL at 11:26

## 2023-06-25 RX ADMIN — HEPARIN SODIUM 5000 UNIT(S): 5000 INJECTION INTRAVENOUS; SUBCUTANEOUS at 16:54

## 2023-06-25 RX ADMIN — PANTOPRAZOLE SODIUM 40 MILLIGRAM(S): 20 TABLET, DELAYED RELEASE ORAL at 05:25

## 2023-06-25 RX ADMIN — SODIUM CHLORIDE 3 MILLILITER(S): 9 INJECTION INTRAMUSCULAR; INTRAVENOUS; SUBCUTANEOUS at 21:31

## 2023-06-25 RX ADMIN — SODIUM CHLORIDE 3 MILLILITER(S): 9 INJECTION INTRAMUSCULAR; INTRAVENOUS; SUBCUTANEOUS at 16:39

## 2023-06-25 NOTE — PROGRESS NOTE ADULT - ASSESSMENT
47 y/o F w/ pMH of CAD s/p PCI 05/2023 w/ 5 stents, HTN, HLD came in for cp and found to have AMI; code stemi called and pt went for urgent cath w/ subsequent YASSINE x1 placed to prox LAD.  pt noted to have stenosis of one of her former stents and will go for staged PCI on Monday        STEMI s/p 1 YASSINE to LAD  - Hx of 5 stents last year   - c/w ASA and plavix  - Utox + for amphetamines but pt is on wellbutrin which can cause false +amphetamine result on utox but also reporting +benzo's which were not given during hospital course   - c/w BB, ACE and statin   - Will go for staged PCI Monday for RCA lesion; npo after midnight tonight   - Monitor on telemetry   - Cardio following and recs noted       HFrEF  - LVEF 45% on TTE   - Clinically euvolemic   - Monitor daily weights and i/o's  - May benefit from SGLT2I prior to d/c       Nicotine dependence  - counseled on abstaining from tobacco use       VTE ppx: heparin sq     Dispo: remains acute

## 2023-06-25 NOTE — PROGRESS NOTE ADULT - ASSESSMENT
44 year old AA female with known CAD with known CAD s/p 5 stents last year in [place], gastric bypass, HTN, nicotine dependence, pericarditis  admitted 06/23/2023 with   1. Chest pain  2. Anterior STEMI  3. s/p cath (06/23/2023) revealed  90 % occlusion of LAD s/p 1 PCI, Restenosis of RCA, planned LHC stage PCI on monday for RCA lesion  4. s/p echo (06/24/2023) EF 45 - 50%, The mid anteroseptal segment and basal inferoseptal segment are hypokinetic. Mild MR. Mild TR.       Plan:  Continue ASA/ Plavix/ BB/ ACEI/ Statin  Restenosis of RCA, planned LHC stage PCI on monday for RCA lesion by Dr. Modi, NPO after midnight  Maintain MAP >65. Maintaining o2 sat > 92 % on room air.    Dr. Modi to resume care Monday AM

## 2023-06-26 LAB
ANION GAP SERPL CALC-SCNC: 12 MMOL/L — SIGNIFICANT CHANGE UP (ref 5–17)
BUN SERPL-MCNC: 18.4 MG/DL — SIGNIFICANT CHANGE UP (ref 8–20)
CALCIUM SERPL-MCNC: 8.9 MG/DL — SIGNIFICANT CHANGE UP (ref 8.4–10.5)
CHLORIDE SERPL-SCNC: 100 MMOL/L — SIGNIFICANT CHANGE UP (ref 96–108)
CO2 SERPL-SCNC: 22 MMOL/L — SIGNIFICANT CHANGE UP (ref 22–29)
CREAT SERPL-MCNC: 0.98 MG/DL — SIGNIFICANT CHANGE UP (ref 0.5–1.3)
EGFR: 71 ML/MIN/1.73M2 — SIGNIFICANT CHANGE UP
GLUCOSE SERPL-MCNC: 83 MG/DL — SIGNIFICANT CHANGE UP (ref 70–99)
HCT VFR BLD CALC: 36.1 % — SIGNIFICANT CHANGE UP (ref 34.5–45)
HGB BLD-MCNC: 11.7 G/DL — SIGNIFICANT CHANGE UP (ref 11.5–15.5)
MAGNESIUM SERPL-MCNC: 1.9 MG/DL — SIGNIFICANT CHANGE UP (ref 1.6–2.6)
MCHC RBC-ENTMCNC: 29.1 PG — SIGNIFICANT CHANGE UP (ref 27–34)
MCHC RBC-ENTMCNC: 32.4 GM/DL — SIGNIFICANT CHANGE UP (ref 32–36)
MCV RBC AUTO: 89.8 FL — SIGNIFICANT CHANGE UP (ref 80–100)
PLATELET # BLD AUTO: 319 K/UL — SIGNIFICANT CHANGE UP (ref 150–400)
POTASSIUM SERPL-MCNC: 4.4 MMOL/L — SIGNIFICANT CHANGE UP (ref 3.5–5.3)
POTASSIUM SERPL-SCNC: 4.4 MMOL/L — SIGNIFICANT CHANGE UP (ref 3.5–5.3)
RBC # BLD: 4.02 M/UL — SIGNIFICANT CHANGE UP (ref 3.8–5.2)
RBC # FLD: 15.9 % — HIGH (ref 10.3–14.5)
SODIUM SERPL-SCNC: 134 MMOL/L — LOW (ref 135–145)
WBC # BLD: 6.28 K/UL — SIGNIFICANT CHANGE UP (ref 3.8–10.5)
WBC # FLD AUTO: 6.28 K/UL — SIGNIFICANT CHANGE UP (ref 3.8–10.5)

## 2023-06-26 PROCEDURE — 99233 SBSQ HOSP IP/OBS HIGH 50: CPT

## 2023-06-26 PROCEDURE — 93010 ELECTROCARDIOGRAM REPORT: CPT

## 2023-06-26 RX ORDER — TRIAMTERENE/HYDROCHLOROTHIAZID 75 MG-50MG
1 TABLET ORAL
Qty: 0 | Refills: 0 | DISCHARGE

## 2023-06-26 RX ORDER — LISINOPRIL 2.5 MG/1
1 TABLET ORAL
Qty: 30 | Refills: 0
Start: 2023-06-26 | End: 2023-07-25

## 2023-06-26 RX ORDER — SODIUM CHLORIDE 9 MG/ML
250 INJECTION INTRAMUSCULAR; INTRAVENOUS; SUBCUTANEOUS ONCE
Refills: 0 | Status: COMPLETED | OUTPATIENT
Start: 2023-06-26 | End: 2023-06-26

## 2023-06-26 RX ORDER — CLOPIDOGREL BISULFATE 75 MG/1
1 TABLET, FILM COATED ORAL
Qty: 30 | Refills: 0
Start: 2023-06-26 | End: 2023-07-25

## 2023-06-26 RX ADMIN — SODIUM CHLORIDE 250 MILLILITER(S): 9 INJECTION INTRAMUSCULAR; INTRAVENOUS; SUBCUTANEOUS at 19:37

## 2023-06-26 RX ADMIN — Medication 81 MILLIGRAM(S): at 13:00

## 2023-06-26 RX ADMIN — SODIUM CHLORIDE 3 MILLILITER(S): 9 INJECTION INTRAMUSCULAR; INTRAVENOUS; SUBCUTANEOUS at 13:04

## 2023-06-26 RX ADMIN — CLOPIDOGREL BISULFATE 75 MILLIGRAM(S): 75 TABLET, FILM COATED ORAL at 13:00

## 2023-06-26 RX ADMIN — CITALOPRAM 20 MILLIGRAM(S): 10 TABLET, FILM COATED ORAL at 13:00

## 2023-06-26 RX ADMIN — ATORVASTATIN CALCIUM 40 MILLIGRAM(S): 80 TABLET, FILM COATED ORAL at 21:56

## 2023-06-26 RX ADMIN — SODIUM CHLORIDE 3 MILLILITER(S): 9 INJECTION INTRAMUSCULAR; INTRAVENOUS; SUBCUTANEOUS at 23:01

## 2023-06-26 RX ADMIN — BUPROPION HYDROCHLORIDE 150 MILLIGRAM(S): 150 TABLET, EXTENDED RELEASE ORAL at 13:00

## 2023-06-26 RX ADMIN — Medication 50 MILLIGRAM(S): at 05:40

## 2023-06-26 RX ADMIN — Medication 650 MILLIGRAM(S): at 21:56

## 2023-06-26 RX ADMIN — LISINOPRIL 5 MILLIGRAM(S): 2.5 TABLET ORAL at 05:40

## 2023-06-26 NOTE — PROGRESS NOTE ADULT - ASSESSMENT
49 y/o F w/ pMH of CAD s/p PCI 05/2023 w/ 5 stents, HTN, HLD came in for cp and found to have AMI; code stemi called and pt went for urgent cath w/ subsequent YASSINE x1 placed to prox LAD.  pt noted to have stenosis of one of her former stents and will go for staged PCI on Monday        STEMI s/p 1 YASSINE to LAD  - Hx of 5 stents last year   - c/w ASA and plavix  - Utox + for amphetamines but pt is on wellbutrin which can cause false +amphetamine result on utox but also reporting +benzo's which were not given during hospital course   - c/w BB, ACE and statin   - Underwent [facility] w/ PCI, 3  stent placed in RCA. Will stay the night and f/u w/ HF and Cardio in AM  - Monitor on telemetry   - Cardio following and recs noted       HFrEF  - LVEF 45% on TTE   - Clinically euvolemic   - Monitor daily weights and i/o's  - May benefit from SGLT2I prior to d/c       Nicotine dependence  - counseled on abstaining from tobacco use       VTE ppx: heparin sq     Dispo: remains acute

## 2023-06-26 NOTE — DISCHARGE NOTE PROVIDER - CARE PROVIDER_API CALL
Brian Ramos  Cardiology  [facility] [place] [address]  Phone: [phone]  Fax: [phone]  Follow Up Time: 2 weeks   Brian Ramos  Cardiology  [facility] [place] [address]  Phone: [phone]  Fax: [phone]  Follow Up Time: 2 weeks    PCP,   Phone: (   )    -  Fax: (   )    -  Follow Up Time: 1 week   Brian Ramos  Cardiology  [facility] [place] [address]  Phone: [phone]  Fax: [phone]  Follow Up Time: 2 weeks    PCP,   Phone: (   )    -  Fax: (   )    -  Follow Up Time: 1 week    Calvin Modi  Interventional Cardiology  [address] [place] [address]  Phone: [phone]  Fax: [phone]  Follow Up Time:

## 2023-06-26 NOTE — DISCHARGE NOTE PROVIDER - NSDCCPTREATMENT_GEN_ALL_CORE_FT
PRINCIPAL PROCEDURE  Procedure: Left heart catheterization  Findings and Treatment: No heavy lifting, driving, sex, tub baths, swimming, or any activity that submerges the lower half of the body in water for 48 hours.  Limited walking and stairs for 48 hours.    Change the bandaid after 24 hours and every 24 hours after that.  Keep the puncture site dry and covered with a bandaid until a scab forms.    Observe the site frequently.  If bleeding or a large lump (the size of a golf ball or bigger) occurs lie flat, apply continuous direct pressure just above the puncture site for at least 10 minutes, and notify your physician immediately.  If the bleeding cannot be controlled, call 911 immediately for assistance.  Notify your physician of pain, swelling or any drainage.    Notify your physician immediately if coldness, numbness, discoloration or pain in your foot occurs.        SECONDARY PROCEDURE  Procedure: Coronary stent placement  Findings and Treatment: Patient teaching done about DAPT  patient  is made aware to take  antiplatelet therapy as prescribed   Statin as prescribed  Aspirin and Plavix  as they are needed to keep your stent/s OPEN  patient is aware to take them every day, do not miss or double up on any doses  these medications can only be discontinued by your cardiologist   TEACH BACK used to verify patient's understanding; pateint verbalizes understanding and gives positive feedback .

## 2023-06-26 NOTE — DISCHARGE NOTE PROVIDER - ATTENDING DISCHARGE PHYSICAL EXAMINATION:
PHYSICAL EXAM:    General: in no acute distress  Eyes: PERRLA, EOMI; conjunctiva and sclera clear  Head: Normocephalic; atraumatic  ENMT: No nasal discharge; airway clear  Neck: Supple; non tender; no masses  Respiratory: No wheezes, rales or rhonchi  Cardiovascular: Regular rate and rhythm. S1 and S2 Normal; No murmurs, gallops or rubs  Gastrointestinal: Soft non-tender non-distended; Normal bowel sounds  Genitourinary: No costovertebral angle tenderness  Extremities: Normal range of motion, No clubbing, cyanosis or edema  Vascular: Peripheral pulses palpable 2+ bilaterally  Neurological: Alert and oriented x4  Skin: Warm and dry. No acute rash  Lymph Nodes: No acute cervical adenopathy  Musculoskeletal: Normal gait, tone, without deformities  Psychiatric: Cooperative and appropriate
53

## 2023-06-26 NOTE — DISCHARGE NOTE PROVIDER - NSDCCPGOAL_GEN_ALL_CORE_FT
Patient reports having a cold and congestion and would like to know what she can take OTC.    Patient asked for return call from nurse and states ok to leave a detailed message if caller receives voicemail.    Ph.  [phone]   To get better and follow your care plan as instructed.

## 2023-06-26 NOTE — DISCHARGE NOTE PROVIDER - NSDCCPCAREPLAN_GEN_ALL_CORE_FT
PRINCIPAL DISCHARGE DIAGNOSIS  Diagnosis: STEMI (ST elevation myocardial infarction)  Assessment and Plan of Treatment: - Continue with current medication regimen as directed   - Follow up with PCP and Cardiology in 1 week      SECONDARY DISCHARGE DIAGNOSES  Diagnosis: Congestive heart failure  Assessment and Plan of Treatment: - Continue medications as directed  - Follow up with Cardiology in 1 week    Diagnosis: Nicotine dependence  Assessment and Plan of Treatment: - Cessation advised   - Follow up with PCP in 1 week     PRINCIPAL DISCHARGE DIAGNOSIS  Diagnosis: STEMI (ST elevation myocardial infarction)  Assessment and Plan of Treatment: - Continue with current medication regimen as directed   - Follow up with PCP and Cardiology in 1 week      SECONDARY DISCHARGE DIAGNOSES  Diagnosis: Congestive heart failure  Assessment and Plan of Treatment: - Continue medications as directed  - Follow up with Cardiology in 1 week    Diagnosis: Nicotine dependence  Assessment and Plan of Treatment: - Cessation advised   - Follow up with PCP in 1 week    Diagnosis: CAD (coronary artery disease)  Assessment and Plan of Treatment: Coronary artery disease is a condition where the arteries the supply the heart muscle get clogges with fatty deposits & puts you at risk for a heart attack  Call your doctor if you have any new pain, pressure, or discomfort in the center of your chest, pain, tingling or discomfort in arms, back, neck, jaw, or stomach, shortness of breath, nausea, vomiting, burping or heartburn, sweating, cold and clammy skin, racing or abnormal heartbeat for more than 10 minutes or if they keep coming & going.  Call 911 and do not tr to get to hospital by care  You can help yourself with lefestyle changes (quitting smoking if you smoke), eat lots of fruits & vegetables & low fat dairy products, not a lot of meat & fatty foods, walk or some form of physical activity most days of the week, lose weight if you are overweight  Take your cardiac medication as prescribed to lower cholesterol, to lower blood pressure, aspirin to prevent blood clots, and diabetes control  Make sure to keep appointments with doctor for cardiac follow up care

## 2023-06-26 NOTE — DISCHARGE NOTE PROVIDER - PROVIDER TOKENS
PROVIDER:[TOKEN:[[id number]],FOLLOWUP:[2 weeks]] PROVIDER:[TOKEN:[[id number]],FOLLOWUP:[2 weeks]],FREE:[LAST:[PCP],PHONE:[(   )    -],FAX:[(   )    -],FOLLOWUP:[1 week]] PROVIDER:[TOKEN:[[id number]],FOLLOWUP:[2 weeks]],FREE:[LAST:[PCP],PHONE:[(   )    -],FAX:[(   )    -],FOLLOWUP:[1 week]],PROVIDER:[TOKEN:[[id number]]]

## 2023-06-26 NOTE — DISCHARGE NOTE PROVIDER - NSDCMRMEDTOKEN_GEN_ALL_CORE_FT
Albuterol (Eqv-ProAir HFA) 90 mcg/inh inhalation aerosol: 2 puff(s) inhaled every 4 hours, As Needed - for shortness of breath and/or wheezing  Aspir 81 oral delayed release tablet: 1 tab(s) orally once a day  atorvastatin 40 mg oral tablet: 1 tab(s) orally once a day (at bedtime)  citalopram 20 mg oral tablet: 1 tab(s) orally once a day  colchicine 0.6 mg oral tablet: 1 tab(s) orally 2 times a day  doxepin 50 mg oral capsule: 1 cap(s) orally once a day (at bedtime)  ergocalciferol 1.25 mg (50,000 intl units) oral capsule: 1 cap(s) orally once a week   metoprolol succinate 25 mg oral tablet, extended release: 1 tab(s) orally 2 times a day  nicotine 7 mg/24 hr transdermal film, extended release: 1 patch transdermal once a day   prasugrel 10 mg oral tablet: 1 tab(s) orally once a day  triamterene-hydrochlorothiazide 37.5 mg-25 mg oral tablet: 1 tab(s) orally once a day  Tylenol 8 Hour 650 mg oral tablet, extended release: 1 tab(s) orally every 8 hours, As Needed -for mild pain   Wellbutrin  mg/24 hours oral tablet, extended release: 1 tab(s) orally every 24 hours   Albuterol (Eqv-ProAir HFA) 90 mcg/inh inhalation aerosol: 2 puff(s) inhaled every 4 hours, As Needed - for shortness of breath and/or wheezing  Aspir 81 oral delayed release tablet: 1 tab(s) orally once a day  atorvastatin 40 mg oral tablet: 1 tab(s) orally once a day (at bedtime)  citalopram 20 mg oral tablet: 1 tab(s) orally once a day  clopidogrel 75 mg oral tablet: 1 tab(s) orally once a day  colchicine 0.6 mg oral tablet: 1 tab(s) orally 2 times a day  doxepin 50 mg oral capsule: 1 cap(s) orally once a day (at bedtime)  ergocalciferol 1.25 mg (50,000 intl units) oral capsule: 1 cap(s) orally once a week   lisinopril 5 mg oral tablet: 1 tab(s) orally once a day  metoprolol succinate 25 mg oral tablet, extended release: 1 tab(s) orally 2 times a day  prasugrel 10 mg oral tablet: 1 tab(s) orally once a day  Tylenol 8 Hour 650 mg oral tablet, extended release: 1 tab(s) orally every 8 hours, As Needed -for mild pain   Wellbutrin  mg/24 hours oral tablet, extended release: 1 tab(s) orally every 24 hours

## 2023-06-26 NOTE — DISCHARGE NOTE PROVIDER - HOSPITAL COURSE
49 y/o F w/ pMH of CAD s/p PCI 05/2023 w/ 5 stents, HTN, HLD came in for cp and found to have AMI; code stemi called and pt went for urgent cath w/ subsequent YASSINE x1 placed to prox LAD.  pt noted to have stenosis of one of her former stents. Pt admitted to [facility] for STEMI. Cardiology was consulted. Pt went for staged PCI 6/26 and is now medically stable for discharge with appropriate outpatient follow up.     All electrolyte abnormalities were monitored carefully and repleted as necessary during this hospitalization. At the time of discharge patient was hemodynamically stable and amenable to all terms of discharge.     47 y/o F w/ pMH of CAD s/p PCI 05/2023 w/ 5 stents, HTN, HLD came in for cp and found to have AMI; code stemi. Pt underwent cath on 6/23, which revealed 90% occlusion of LAD s/p 1 PCI, restenosis of RCA. Pt admitted for further workup. S/p echo (06/24/2023) EF 45 - 50%, The mid anteroseptal segment and basal inferoseptal segment are hypokinetic. Mild MR. Mild TR. Patient s/p left heart catheterization with intervention via Left femoral artery approach 06/26/23 for RCA stenosis, PCI with YASSINE x3 to RCA.   B/L groin sites remain benign, no bleeding, no hematoma. All electrolyte abnormalities were monitored carefully and repleted as necessary during this hospitalization. At the time of discharge patient was hemodynamically stable and amenable to all terms of discharge.     47 y/o F w/ pMH of CAD s/p PCI 05/2023 w/ 5 stents, HTN, HLD came in for cp and found to have AMI; code stemi. Pt underwent cath on 6/23, which revealed 90% occlusion of LAD s/p 1 PCI, restenosis of RCA. Pt admitted for further workup. S/p echo (06/24/2023) EF 45 - 50%, The mid anteroseptal segment and basal inferoseptal segment are hypokinetic. Mild MR. Mild TR. Patient s/p left heart catheterization with intervention via Left femoral artery approach 06/26/23 for RCA stenosis, PCI with YASSINE x3 to RCA. B/L groin sites remain benign, no bleeding, no hematoma. All electrolyte abnormalities were monitored carefully and repleted as necessary during this hospitalization. At the time of discharge patient was hemodynamically stable and amenable to all terms of discharge.

## 2023-06-26 NOTE — PROGRESS NOTE ADULT - ASSESSMENT
Risk Assessments:  ASA: 3  Mallampati: 2  Bleeding Risk:  Creatinine: 2.9%  GFR: 71      Impression: 49 yo female s/p STEMI 6/23 with YASSINE to LAD here for staged PCI of RCA.    Plan:  -plan for PCI via LFA  -patient seen and examined  -confirmed appropriate NPO duration  -PASCUAL prophylaxis NS 250cc bolus ordered  -ECG and Labs reviewed  -Aspirin 81mg and Plavix 75 mg po pre-cath  -procedure discussed with patient; risks and benefits explained, questions answered  -consent obtained by attending IC

## 2023-06-26 NOTE — PROGRESS NOTE ADULT - ASSESSMENT
44 year old AA female with known CAD, s/p 5 stents last year in [place], gastric bypass, HTN, nicotine dependence, pericarditis  admitted 06/23/2023 with chest pain and found to have anterior STEMI. s/p cath (06/23/2023) revealed   90% occlusion of LAD s/p 1 PCI, Restenosis of RCA and here for staged PCI of RCA.  s/p echo (06/24/2023) EF 45 - 50%, The mid anteroseptal segment and basal inferoseptal segment are hypokinetic. Mild MR. Mild TR.   Patient presented to [facility] for staged PCI to RCA.  Patient  now s/p left heart catheterization via Left femoral artery (# 6F sheath, s/p angio seal)   approach with Dr. Modi, tolerated procedure well without complications. Arrived to recovery room NAD and hemodynamically stable, distal pulse +, neurovascular intact        Intraprocedurally: Patient received 9,000 units IV Heparin    Patient received Omnipaque: 79ml    Findings:     < from: Cardiac Catheterization (06.26.23 @ 16:54) >  Diagnostic Findings:     LM   Left main artery: The segment is visually normal in size and  structure.    LAD   Proximal left anterior descending: The previously placed stent is a  drug-eluting stent and is patent. Mid left anterior descending:  The previously placed stent is a drug-eluting stent and is patent.      CX   Circumflex: The segment is visually normal in size and structure.      RCA   Ostial right coronary artery: The stent is undersized.  There is 81%  by area stenosis.. There is an 80 % in-stent restenosis.  Intravascular ultrasound was performed. Imaging shows cross sectional  area of 4.2 mm2. Based on the results, the lesion was  judged to be significant and an intervention was performed. Mid right  coronary artery: The stent is undersized.  There is a 77%  by area stenosis.. There is an 80 % in-stent restenosis. Intravascular  ultrasound was performed. Imaging shows cross  sectional area of 4.9 mm2. Based on the results, the lesion was judged  to be significant and an intervention was performed.  Distal right coronary artery: There is an 80 % in-stent restenosis.  Intravascular ultrasound was performed. Imaging shows cross  sectional area of 3.6 mm2. Based on the results, the lesion was judged  to be significant and an intervention was performed.    Ramus   Ramus intermedius: The segment is visually normal in size and  structure.  Interventional Details   Distal right coronary artery: The initial stenosis was 80 %.     A successful IVUS was performed  A successful Cutting Balloon angioplasty was performed   A successful Drug Eluting Stent was deployed using a ELENA FRONTIER 4.0  X 38MM During Procedure.  A successful Balloon angioplasty was performed    mid right coronary artery: The initial stenosis was 80 %.   A successful Drug Eluting Stent was deployed   A successful Balloon angioplasty was performed   Ostial right coronary artery: The initial stenosis was 80 %. A successful Drug Eluting Stent was deployed  A successful Balloon angioplasty was performed  A successful Balloon angioplasty was performed        Post Cath EKG: SB WITH Sinus arrhythmia. HR: 56 BPM  Non specific T wave abnormality, No acute ST/T cahnges    -ADMIT due to: / Pt is already in-patient, met major criteria secondary to s/p LHC with intervention  -post cardiac cath orders  -Left groin warm, perfusing, disatl pulses 2+, NV status intact  Left groin precautions  -bedrest x 3hours post procedure until 21: 15 pm, patient can sit up at 20: 15 pm, OOB after 21: 15 PM   -labs and EKG in am  -NS 0.9% 250ml/hr x 1 bolus: post procedure PASCUAL ppx   -continue current medical therapy  -C/W Aspirin 81mg Po daily  -C./W Plavix 75mg Po daily  -Dual anti platelet therapy with aspirin/ plavix  reinforced importance of strict adherence to DAPT   -statin therapy: C/W Lipitor 40mg PO HS  -beta blocker: C/W metoprolol succinate 50mg PO daily  -follow up outpt in 2 weeks with Cardiologist DR Modi  -Lifestyle modifications discussed to reduce cardiovascular risk factors including weight reduction, smoking cessation, medication compliance, and routine follow up with Cardiologist to track your BMI, cholesterol, and glucose levels.   - cardiac rehab info provided/referral and communication to cardiac rehab completed  -Further Management and Discharge plan per Hospitalist /Cardiology  -Patient can be transferred to the tele unit when criteria met  -Discussed with DR Modi         44 year old AA female with known CAD, s/p 5 stents last year in [place], gastric bypass, HTN, nicotine dependence, pericarditis  admitted 06/23/2023 with chest pain and found to have anterior STEMI. s/p cath (06/23/2023) revealed   90% occlusion of LAD s/p 1 PCI, Restenosis of RCA and here for staged PCI of RCA.  s/p echo (06/24/2023) EF 45 - 50%, The mid anteroseptal segment and basal inferoseptal segment are hypokinetic. Mild MR. Mild TR.   Patient presented to [facility] for staged PCI to RCA.  Patient  now s/p left heart catheterization via Left femoral artery (# 6F sheath, s/p angio seal)   approach with Dr. Modi, tolerated procedure well without complications. Arrived to recovery room NAD and hemodynamically stable, distal pulse +, neurovascular intact        Intraprocedurally: Patient received 9,000 units IV Heparin    Patient received Omnipaque: 79ml    Post procedure patient received Plavix 300mg PO x1 dose    Findings:     < from: Cardiac Catheterization (06.26.23 @ 16:54) >  Diagnostic Findings:     LM   Left main artery: The segment is visually normal in size and  structure.    LAD   Proximal left anterior descending: The previously placed stent is a  drug-eluting stent and is patent. Mid left anterior descending:  The previously placed stent is a drug-eluting stent and is patent.      CX   Circumflex: The segment is visually normal in size and structure.      RCA   Ostial right coronary artery: The stent is undersized.  There is 81%  by area stenosis.. There is an 80 % in-stent restenosis.  Intravascular ultrasound was performed. Imaging shows cross sectional  area of 4.2 mm2. Based on the results, the lesion was  judged to be significant and an intervention was performed. Mid right  coronary artery: The stent is undersized.  There is a 77%  by area stenosis.. There is an 80 % in-stent restenosis. Intravascular  ultrasound was performed. Imaging shows cross  sectional area of 4.9 mm2. Based on the results, the lesion was judged  to be significant and an intervention was performed.  Distal right coronary artery: There is an 80 % in-stent restenosis.  Intravascular ultrasound was performed. Imaging shows cross  sectional area of 3.6 mm2. Based on the results, the lesion was judged  to be significant and an intervention was performed.    Ramus   Ramus intermedius: The segment is visually normal in size and  structure.  Interventional Details   Distal right coronary artery: The initial stenosis was 80 %.     A successful IVUS was performed  A successful Cutting Balloon angioplasty was performed   A successful Drug Eluting Stent was deployed using a ELENA FRONTIER 4.0  X 38MM During Procedure.  A successful Balloon angioplasty was performed    mid right coronary artery: The initial stenosis was 80 %.   A successful Drug Eluting Stent was deployed   A successful Balloon angioplasty was performed   Ostial right coronary artery: The initial stenosis was 80 %. A successful Drug Eluting Stent was deployed  A successful Balloon angioplasty was performed  A successful Balloon angioplasty was performed        Post Cath EKG: SB WITH Sinus arrhythmia. HR: 56 BPM  Non specific T wave abnormality, No acute ST/T cahnges    -ADMIT due to: / Pt is already in-patient, met major criteria secondary to s/p LHC with intervention  -post cardiac cath orders  -Left groin warm, perfusing, disatl pulses 2+, NV status intact  Left groin precautions  -bedrest x 3hours post procedure until 21: 15 pm, patient can sit up at 20: 15 pm, OOB after 21: 15 PM   -labs and EKG in am  -NS 0.9% 250ml/hr x 1 bolus: post procedure PASCUAL ppx   -continue current medical therapy  -C/W Aspirin 81mg Po daily  -C./W Plavix 75mg Po daily  -Dual anti platelet therapy with aspirin/ plavix  reinforced importance of strict adherence to DAPT   -statin therapy: C/W Lipitor 40mg PO HS  -beta blocker: C/W metoprolol succinate 50mg PO daily  -follow up outpt in 2 weeks with Cardiologist DR Modi  -Lifestyle modifications discussed to reduce cardiovascular risk factors including weight reduction, smoking cessation, medication compliance, and routine follow up with Cardiologist to track your BMI, cholesterol, and glucose levels.   - cardiac rehab info provided/referral and communication to cardiac rehab completed  -Further Management and Discharge plan per Hospitalist /Cardiology  -Patient can be transferred to the tele unit when criteria met  -Discussed with DR Modi

## 2023-06-27 LAB
ANION GAP SERPL CALC-SCNC: 12 MMOL/L — SIGNIFICANT CHANGE UP (ref 5–17)
BASOPHILS # BLD AUTO: 0.01 K/UL — SIGNIFICANT CHANGE UP (ref 0–0.2)
BASOPHILS NFR BLD AUTO: 0.2 % — SIGNIFICANT CHANGE UP (ref 0–2)
BUN SERPL-MCNC: 20.3 MG/DL — HIGH (ref 8–20)
CALCIUM SERPL-MCNC: 8.9 MG/DL — SIGNIFICANT CHANGE UP (ref 8.4–10.5)
CHLORIDE SERPL-SCNC: 102 MMOL/L — SIGNIFICANT CHANGE UP (ref 96–108)
CO2 SERPL-SCNC: 20 MMOL/L — LOW (ref 22–29)
CREAT SERPL-MCNC: 0.84 MG/DL — SIGNIFICANT CHANGE UP (ref 0.5–1.3)
EGFR: 85 ML/MIN/1.73M2 — SIGNIFICANT CHANGE UP
EOSINOPHIL # BLD AUTO: 0.15 K/UL — SIGNIFICANT CHANGE UP (ref 0–0.5)
EOSINOPHIL NFR BLD AUTO: 2.3 % — SIGNIFICANT CHANGE UP (ref 0–6)
GLUCOSE SERPL-MCNC: 88 MG/DL — SIGNIFICANT CHANGE UP (ref 70–99)
HCT VFR BLD CALC: 37.6 % — SIGNIFICANT CHANGE UP (ref 34.5–45)
HGB BLD-MCNC: 12 G/DL — SIGNIFICANT CHANGE UP (ref 11.5–15.5)
IMM GRANULOCYTES NFR BLD AUTO: 0.3 % — SIGNIFICANT CHANGE UP (ref 0–0.9)
LYMPHOCYTES # BLD AUTO: 1.52 K/UL — SIGNIFICANT CHANGE UP (ref 1–3.3)
LYMPHOCYTES # BLD AUTO: 23.6 % — SIGNIFICANT CHANGE UP (ref 13–44)
MCHC RBC-ENTMCNC: 28.8 PG — SIGNIFICANT CHANGE UP (ref 27–34)
MCHC RBC-ENTMCNC: 31.9 GM/DL — LOW (ref 32–36)
MCV RBC AUTO: 90.2 FL — SIGNIFICANT CHANGE UP (ref 80–100)
MONOCYTES # BLD AUTO: 0.61 K/UL — SIGNIFICANT CHANGE UP (ref 0–0.9)
MONOCYTES NFR BLD AUTO: 9.5 % — SIGNIFICANT CHANGE UP (ref 2–14)
NEUTROPHILS # BLD AUTO: 4.12 K/UL — SIGNIFICANT CHANGE UP (ref 1.8–7.4)
NEUTROPHILS NFR BLD AUTO: 64.1 % — SIGNIFICANT CHANGE UP (ref 43–77)
PLATELET # BLD AUTO: 313 K/UL — SIGNIFICANT CHANGE UP (ref 150–400)
POTASSIUM SERPL-MCNC: 4.7 MMOL/L — SIGNIFICANT CHANGE UP (ref 3.5–5.3)
POTASSIUM SERPL-SCNC: 4.7 MMOL/L — SIGNIFICANT CHANGE UP (ref 3.5–5.3)
RBC # BLD: 4.17 M/UL — SIGNIFICANT CHANGE UP (ref 3.8–5.2)
RBC # FLD: 16 % — HIGH (ref 10.3–14.5)
SODIUM SERPL-SCNC: 134 MMOL/L — LOW (ref 135–145)
WBC # BLD: 6.43 K/UL — SIGNIFICANT CHANGE UP (ref 3.8–10.5)
WBC # FLD AUTO: 6.43 K/UL — SIGNIFICANT CHANGE UP (ref 3.8–10.5)

## 2023-06-27 PROCEDURE — 99232 SBSQ HOSP IP/OBS MODERATE 35: CPT

## 2023-06-27 PROCEDURE — 93010 ELECTROCARDIOGRAM REPORT: CPT

## 2023-06-27 RX ADMIN — BUPROPION HYDROCHLORIDE 150 MILLIGRAM(S): 150 TABLET, EXTENDED RELEASE ORAL at 12:19

## 2023-06-27 RX ADMIN — CITALOPRAM 20 MILLIGRAM(S): 10 TABLET, FILM COATED ORAL at 12:19

## 2023-06-27 RX ADMIN — SODIUM CHLORIDE 3 MILLILITER(S): 9 INJECTION INTRAMUSCULAR; INTRAVENOUS; SUBCUTANEOUS at 21:34

## 2023-06-27 RX ADMIN — HEPARIN SODIUM 5000 UNIT(S): 5000 INJECTION INTRAVENOUS; SUBCUTANEOUS at 06:06

## 2023-06-27 RX ADMIN — CLOPIDOGREL BISULFATE 75 MILLIGRAM(S): 75 TABLET, FILM COATED ORAL at 12:19

## 2023-06-27 RX ADMIN — SODIUM CHLORIDE 3 MILLILITER(S): 9 INJECTION INTRAMUSCULAR; INTRAVENOUS; SUBCUTANEOUS at 06:00

## 2023-06-27 RX ADMIN — ATORVASTATIN CALCIUM 40 MILLIGRAM(S): 80 TABLET, FILM COATED ORAL at 21:35

## 2023-06-27 RX ADMIN — Medication 81 MILLIGRAM(S): at 12:19

## 2023-06-27 RX ADMIN — Medication 50 MILLIGRAM(S): at 06:06

## 2023-06-27 RX ADMIN — HEPARIN SODIUM 5000 UNIT(S): 5000 INJECTION INTRAVENOUS; SUBCUTANEOUS at 17:16

## 2023-06-27 RX ADMIN — PANTOPRAZOLE SODIUM 40 MILLIGRAM(S): 20 TABLET, DELAYED RELEASE ORAL at 06:05

## 2023-06-27 RX ADMIN — LISINOPRIL 5 MILLIGRAM(S): 2.5 TABLET ORAL at 06:05

## 2023-06-27 NOTE — DISCHARGE NOTE NURSING/CASE MANAGEMENT/SOCIAL WORK - PATIENT PORTAL LINK FT
You can access the FollowMyHealth Patient Portal offered by [facility] by registering at the following website: [email or web address]. By joining [employer]’s FollowMyHealth portal, you will also be able to view your health information using other applications (apps) compatible with our system.

## 2023-06-27 NOTE — PROGRESS NOTE ADULT - ASSESSMENT
49 y/o F w/ pMH of CAD s/p PCI 05/2023 w/ 5 stents, HTN, HLD came in for cp and found to have AMI; code stemi called and pt went for urgent cath w/ subsequent YASSINE x1 placed to prox LAD.  pt noted to have stenosis of one of her former stents and will go for staged PCI on Monday        STEMI s/p 1 YASSINE to LAD  - Hx of 5 stents last year   - c/w ASA and plavix  - Utox + for amphetamines but pt is on wellbutrin which can cause false +amphetamine result on utox but also reporting +benzo's which were not given during hospital course   - c/w BB, ACE and statin   - Underwent [facility] w/ PCI, 3  stent placed in RCA  - Monitor on telemetry   - Cardio following and recs noted       HFrEF  - LVEF 45% on TTE   - Clinically euvolemic   - Monitor daily weights and i/o's  - May benefit from SGLT2I prior to d/c       Nicotine dependence  - counseled on abstaining from tobacco use       VTE ppx: heparin sq     Dispo: Cleared for discharge however notified SW as she was leaving that she needs [facility] for shelter, will likely need 24-48 hours for auth

## 2023-06-27 NOTE — DISCHARGE NOTE NURSING/CASE MANAGEMENT/SOCIAL WORK - NSDCPEFALRISK_GEN_ALL_CORE
For information on Fall & Injury Prevention, visit: [email or web address] OR  [email or web address] OR  https://www.cdc.gov/steadi/patient.html

## 2023-06-28 PROCEDURE — 99232 SBSQ HOSP IP/OBS MODERATE 35: CPT

## 2023-06-28 RX ADMIN — BUPROPION HYDROCHLORIDE 150 MILLIGRAM(S): 150 TABLET, EXTENDED RELEASE ORAL at 17:11

## 2023-06-28 RX ADMIN — HEPARIN SODIUM 5000 UNIT(S): 5000 INJECTION INTRAVENOUS; SUBCUTANEOUS at 05:44

## 2023-06-28 RX ADMIN — CLOPIDOGREL BISULFATE 75 MILLIGRAM(S): 75 TABLET, FILM COATED ORAL at 17:11

## 2023-06-28 RX ADMIN — SODIUM CHLORIDE 3 MILLILITER(S): 9 INJECTION INTRAMUSCULAR; INTRAVENOUS; SUBCUTANEOUS at 05:47

## 2023-06-28 RX ADMIN — ATORVASTATIN CALCIUM 40 MILLIGRAM(S): 80 TABLET, FILM COATED ORAL at 21:10

## 2023-06-28 RX ADMIN — LISINOPRIL 5 MILLIGRAM(S): 2.5 TABLET ORAL at 05:44

## 2023-06-28 RX ADMIN — Medication 81 MILLIGRAM(S): at 17:11

## 2023-06-28 RX ADMIN — Medication 50 MILLIGRAM(S): at 05:44

## 2023-06-28 RX ADMIN — HEPARIN SODIUM 5000 UNIT(S): 5000 INJECTION INTRAVENOUS; SUBCUTANEOUS at 17:11

## 2023-06-28 RX ADMIN — CITALOPRAM 20 MILLIGRAM(S): 10 TABLET, FILM COATED ORAL at 17:11

## 2023-06-28 RX ADMIN — PANTOPRAZOLE SODIUM 40 MILLIGRAM(S): 20 TABLET, DELAYED RELEASE ORAL at 06:04

## 2023-06-28 RX ADMIN — SODIUM CHLORIDE 3 MILLILITER(S): 9 INJECTION INTRAMUSCULAR; INTRAVENOUS; SUBCUTANEOUS at 22:00

## 2023-06-28 RX ADMIN — SODIUM CHLORIDE 3 MILLILITER(S): 9 INJECTION INTRAMUSCULAR; INTRAVENOUS; SUBCUTANEOUS at 13:10

## 2023-06-28 NOTE — PROGRESS NOTE ADULT - ASSESSMENT
49 y/o F w/ pMH of CAD s/p PCI 05/2023 w/ 5 stents, HTN, HLD came in for cp and found to have AMI; code stemi called and pt went for urgent cath w/ subsequent YASSINE x1 placed to prox LAD.  pt noted to have stenosis of one of her former stents and will go for staged PCI on Monday        STEMI s/p 1 YASSINE to LAD  - Hx of 5 stents last year   - c/w ASA and plavix  - Utox + for amphetamines but pt is on wellbutrin which can cause false +amphetamine result on utox but also reporting +benzo's which were not given during hospital course   - c/w BB, ACE and statin   - [facility] w/ PCI, 3  stent placed in RCA  - Monitor on telemetry   - Cardio following and recs noted       HFrEF  - LVEF 45% on TTE   - Clinically euvolemic   - Monitor daily weights and i/o's  - May benefit from SGLT2I prior to d/c       Nicotine dependence  - counseled on abstaining from tobacco use       VTE ppx: heparin sq     Dispo: Awaiting auth for shelter

## 2023-06-29 VITALS
DIASTOLIC BLOOD PRESSURE: 86 MMHG | RESPIRATION RATE: 18 BRPM | OXYGEN SATURATION: 99 % | TEMPERATURE: 98 F | SYSTOLIC BLOOD PRESSURE: 123 MMHG | HEART RATE: 77 BPM

## 2023-06-29 PROCEDURE — C1887: CPT

## 2023-06-29 PROCEDURE — 85025 COMPLETE CBC W/AUTO DIFF WBC: CPT

## 2023-06-29 PROCEDURE — 92978 ENDOLUMINL IVUS OCT C 1ST: CPT | Mod: LD

## 2023-06-29 PROCEDURE — 85027 COMPLETE CBC AUTOMATED: CPT

## 2023-06-29 PROCEDURE — C1760: CPT

## 2023-06-29 PROCEDURE — 82553 CREATINE MB FRACTION: CPT

## 2023-06-29 PROCEDURE — 80061 LIPID PANEL: CPT

## 2023-06-29 PROCEDURE — 87640 STAPH A DNA AMP PROBE: CPT

## 2023-06-29 PROCEDURE — C1894: CPT

## 2023-06-29 PROCEDURE — 82550 ASSAY OF CK (CPK): CPT

## 2023-06-29 PROCEDURE — 84484 ASSAY OF TROPONIN QUANT: CPT

## 2023-06-29 PROCEDURE — C1874: CPT

## 2023-06-29 PROCEDURE — 80053 COMPREHEN METABOLIC PANEL: CPT

## 2023-06-29 PROCEDURE — C9606: CPT | Mod: LD

## 2023-06-29 PROCEDURE — 93306 TTE W/DOPPLER COMPLETE: CPT

## 2023-06-29 PROCEDURE — 71045 X-RAY EXAM CHEST 1 VIEW: CPT

## 2023-06-29 PROCEDURE — 99285 EMERGENCY DEPT VISIT HI MDM: CPT

## 2023-06-29 PROCEDURE — C1769: CPT

## 2023-06-29 PROCEDURE — 93005 ELECTROCARDIOGRAM TRACING: CPT

## 2023-06-29 PROCEDURE — 96374 THER/PROPH/DIAG INJ IV PUSH: CPT

## 2023-06-29 PROCEDURE — 83735 ASSAY OF MAGNESIUM: CPT

## 2023-06-29 PROCEDURE — C1753: CPT

## 2023-06-29 PROCEDURE — 84702 CHORIONIC GONADOTROPIN TEST: CPT

## 2023-06-29 PROCEDURE — 84100 ASSAY OF PHOSPHORUS: CPT

## 2023-06-29 PROCEDURE — 83880 ASSAY OF NATRIURETIC PEPTIDE: CPT

## 2023-06-29 PROCEDURE — 96375 TX/PRO/DX INJ NEW DRUG ADDON: CPT

## 2023-06-29 PROCEDURE — 80048 BASIC METABOLIC PNL TOTAL CA: CPT

## 2023-06-29 PROCEDURE — 80051 ELECTROLYTE PANEL: CPT

## 2023-06-29 PROCEDURE — 85652 RBC SED RATE AUTOMATED: CPT

## 2023-06-29 PROCEDURE — 80307 DRUG TEST PRSMV CHEM ANLYZR: CPT

## 2023-06-29 PROCEDURE — 87641 MR-STAPH DNA AMP PROBE: CPT

## 2023-06-29 PROCEDURE — 36415 COLL VENOUS BLD VENIPUNCTURE: CPT

## 2023-06-29 PROCEDURE — C1725: CPT

## 2023-06-29 PROCEDURE — 99239 HOSP IP/OBS DSCHRG MGMT >30: CPT

## 2023-06-29 PROCEDURE — 93458 L HRT ARTERY/VENTRICLE ANGIO: CPT | Mod: 59

## 2023-06-29 RX ADMIN — CITALOPRAM 20 MILLIGRAM(S): 10 TABLET, FILM COATED ORAL at 12:59

## 2023-06-29 RX ADMIN — CLOPIDOGREL BISULFATE 75 MILLIGRAM(S): 75 TABLET, FILM COATED ORAL at 12:57

## 2023-06-29 RX ADMIN — SODIUM CHLORIDE 3 MILLILITER(S): 9 INJECTION INTRAMUSCULAR; INTRAVENOUS; SUBCUTANEOUS at 05:15

## 2023-06-29 RX ADMIN — BUPROPION HYDROCHLORIDE 150 MILLIGRAM(S): 150 TABLET, EXTENDED RELEASE ORAL at 12:57

## 2023-06-29 RX ADMIN — PANTOPRAZOLE SODIUM 40 MILLIGRAM(S): 20 TABLET, DELAYED RELEASE ORAL at 05:17

## 2023-06-29 RX ADMIN — SODIUM CHLORIDE 3 MILLILITER(S): 9 INJECTION INTRAMUSCULAR; INTRAVENOUS; SUBCUTANEOUS at 13:13

## 2023-06-29 RX ADMIN — LISINOPRIL 5 MILLIGRAM(S): 2.5 TABLET ORAL at 10:50

## 2023-06-29 RX ADMIN — HEPARIN SODIUM 5000 UNIT(S): 5000 INJECTION INTRAVENOUS; SUBCUTANEOUS at 05:17

## 2023-06-29 RX ADMIN — Medication 81 MILLIGRAM(S): at 12:58

## 2023-06-29 RX ADMIN — Medication 50 MILLIGRAM(S): at 10:50

## 2023-06-29 NOTE — PROGRESS NOTE ADULT - PROVIDER SPECIALTY LIST ADULT
Cardiology
Hospitalist
Hospitalist
Intervent Cardiology
Cardiology
Hospitalist
Intervent Cardiology
Hospitalist

## 2023-06-29 NOTE — PROGRESS NOTE ADULT - SUBJECTIVE AND OBJECTIVE BOX
Hospitalist Progress Note    Chief Complaint:  STEMI    SUBJECTIVE / OVERNIGHT EVENTS:   No acute events reported overnight.  Pt offers no acute complaints at this time.     MEDICATIONS  (STANDING):  aspirin enteric coated 81 milliGRAM(s) Oral daily  atorvastatin 40 milliGRAM(s) Oral at bedtime  buPROPion XL (24-Hour) . 150 milliGRAM(s) Oral daily  citalopram 20 milliGRAM(s) Oral daily  clopidogrel Tablet 75 milliGRAM(s) Oral daily  heparin   Injectable 5000 Unit(s) SubCutaneous every 12 hours  lisinopril 5 milliGRAM(s) Oral daily  metoprolol succinate ER 50 milliGRAM(s) Oral daily  nitroglycerin     SubLingual 0.4 milliGRAM(s) SubLingual Once  pantoprazole    Tablet 40 milliGRAM(s) Oral before breakfast  sodium chloride 0.9% lock flush 3 milliLiter(s) IV Push every 8 hours    MEDICATIONS  (PRN):  acetaminophen     Tablet .. 650 milliGRAM(s) Oral every 6 hours PRN Temp greater or equal to 38C (100.4F), Mild Pain (1 - 3)        I&O's Summary      PHYSICAL EXAM:  Vital Signs Last 24 Hrs  T(C): 36.7 (27 Jun 2023 10:30), Max: 37.1 (26 Jun 2023 16:03)  T(F): 98 (27 Jun 2023 10:30), Max: 98.7 (26 Jun 2023 16:03)  HR: 74 (27 Jun 2023 10:30) (56 - 113)  BP: 109/67 (27 Jun 2023 10:30) (109/57 - 156/77)  BP(mean): --  RR: 18 (26 Jun 2023 20:55) (16 - 18)  SpO2: 98% (27 Jun 2023 10:30) (93% - 100%)    Parameters below as of 27 Jun 2023 10:30  Patient On (Oxygen Delivery Method): room air          GENEARL: pt examined bedside, laying comfortably in bed in NAD  HEENT: NC/AT, moist oral mucosa, clear conjunctiva, sclera nonicteric  RESPIRATORY: Normal respiratory effort, no wheezing, rhonchi, rales  CARDIOVASCULAR: RRR, normal S1 and S2  ABDOMEN: soft, obsese, NT/ND, normoactive bowel sounds, no rebound/guarding  EXTREMITIES: No cynaosis, no clubbing, no lower extremity edema  NEUROLOGY: A+O to person, place, and time, no focal neurologic deficits appreciated   SKIN: No rashes or no palpable lesions    LABS:                        12.0   6.43  )-----------( 313      ( 27 Jun 2023 05:08 )             37.6     06-27    134<L>  |  102  |  20.3<H>  ----------------------------<  88  4.7   |  20.0<L>  |  0.84    Ca    8.9      27 Jun 2023 05:08  Mg     1.9     06-26            Urinalysis Basic - ( 27 Jun 2023 05:08 )    Color: x / Appearance: x / SG: x / pH: x  Gluc: 88 mg/dL / Ketone: x  / Bili: x / Urobili: x   Blood: x / Protein: x / Nitrite: x   Leuk Esterase: x / RBC: x / WBC x   Sq Epi: x / Non Sq Epi: x / Bacteria: x        CAPILLARY BLOOD GLUCOSE            RADIOLOGY & ADDITIONAL TESTS:  Results Reviewed: Y  Imaging Personally Reviewed: N  Electrocardiogram Personally Reviewed: N                                          
MICU downthgthrthathdtheth:th th4th7th y/o F w/ pMH of CAD s/p PCI 05/2023 w/ 5 stents came in for cp and found to have AMI.  Cardio consulted and pt when for urgent cath w/ subsequent YASSINE x1 placed to prox LAD.  pt noted to have stenosis of one of her former stents and will go for staged PCI on monday.      SUBJECTIVE / OVERNIGHT EVENTS: No acute events reported overnight.  Pt offers kristal milian complaitns at is time.         I&O's Summary    23 Jun 2023 07:01  -  24 Jun 2023 07:00  --------------------------------------------------------  IN: 100 mL / OUT: 600 mL / NET: -500 mL    24 Jun 2023 07:01  -  24 Jun 2023 17:38  --------------------------------------------------------  IN: 360 mL / OUT: 400 mL / NET: -40 mL          PHYSICAL EXAM:  Vital Signs Last 24 Hrs  T(C): 36.5 (24 Jun 2023 16:44), Max: 36.8 (24 Jun 2023 11:12)  T(F): 97.7 (24 Jun 2023 16:44), Max: 98.3 (24 Jun 2023 11:12)  HR: 68 (24 Jun 2023 16:44) (61 - 91)  BP: 106/70 (24 Jun 2023 16:44) (106/70 - 155/88)  BP(mean): 86 (24 Jun 2023 15:49) (80 - 112)  RR: 18 (24 Jun 2023 16:44) (9 - 27)  SpO2: 99% (24 Jun 2023 16:44) (95% - 100%)    Parameters below as of 24 Jun 2023 16:44  Patient On (Oxygen Delivery Method): room air        GENEARL: pt examined bedside, laying comfortably in bed in NAD  HEENT: NC/AT, moist oral mucosa, clear conjunctiva, sclera nonicteric  RESPIRATORY: Normal respiratory effort, no wheezing, rhonchi, rales  CARDIOVASCULAR: RRR, normal S1 and S2  ABDOMEN: soft, obsese, NT/ND, normoactive bowel sounds, no rebound/guarding  EXTREMITIES: No cynaosis, no clubbing, no lower extremity edema  NEUROLOGY: A+O to person, place, and time, no focal neurologic deficits appreciated   SKIN: No rashes or no palpable lesions        LABS:                        11.4   8.55  )-----------( 311      ( 24 Jun 2023 02:55 )             33.9     06-24    133<L>  |  101  |  x   ----------------------------<  x   4.7   |  21.0<L>  |  x     Ca    9.9      23 Jun 2023 21:44  Mg     2.1     06-23    < from: Xray Chest 1 View- PORTABLE-Urgent (06.23.23 @ 22:18) >  IMPRESSION:  No active parenchymal disease in the chest.    < end of copied text >  TPro  7.7  /  Alb  4.3  /  TBili  0.4  /  DBili  x   /  AST  25  /  ALT  18  /  AlkPhos  40  06-23      CARDIAC MARKERS ( 23 Jun 2023 21:44 )  x     / <0.01 ng/mL / 309 U/L / x     / 4.5 ng/mL      Urinalysis Basic - ( 23 Jun 2023 21:44 )    Color: x / Appearance: x / SG: x / pH: x  Gluc: 110 mg/dL / Ketone: x  / Bili: x / Urobili: x   Blood: x / Protein: x / Nitrite: x   Leuk Esterase: x / RBC: x / WBC x   Sq Epi: x / Non Sq Epi: x / Bacteria: x        CAPILLARY BLOOD GLUCOSE        RADIOLOGY & ADDITIONAL TESTS:    < from: Xray Chest 1 View- PORTABLE-Urgent (06.23.23 @ 22:18) >  IMPRESSION:  No active parenchymal disease in the chest.    < end of copied text >    < from: TTE Echo Complete w/o Contrast w/ Doppler (06.24.23 @ 11:19) >  Summary:   1. Left ventricular ejection fraction, by visual estimation, is 45 to   50%.   2. Mildly decreased segmental left ventricular systolic function.   3. Mid anteroseptal segment and basal inferoseptal segment are abnormal   as described above.   4. Spectral Doppler shows pseudonormal pattern of left ventricular   myocardial filling (Grade II diastolic dysfunction).   5. Normal right ventricular size and function.   6. Mild mitral valve regurgitation.   7. Mild tricuspid regurgitation.    < end of copied text >      MEDICATIONS  (STANDING):  aspirin enteric coated 81 milliGRAM(s) Oral daily  atorvastatin 40 milliGRAM(s) Oral at bedtime  buPROPion XL (24-Hour) . 150 milliGRAM(s) Oral daily  citalopram 20 milliGRAM(s) Oral daily  clopidogrel Tablet 75 milliGRAM(s) Oral daily  lisinopril 5 milliGRAM(s) Oral daily  metoprolol succinate ER 50 milliGRAM(s) Oral daily  nitroglycerin     SubLingual 0.4 milliGRAM(s) SubLingual Once  pantoprazole    Tablet 40 milliGRAM(s) Oral before breakfast  sodium chloride 0.9% lock flush 3 milliLiter(s) IV Push every 8 hours    MEDICATIONS  (PRN):  acetaminophen     Tablet .. 650 milliGRAM(s) Oral every 6 hours PRN Temp greater or equal to 38C (100.4F), Mild Pain (1 - 3)                                  
                                                                        Department of Cardiology                                                                  [facility] [id number]                                                            Telephone: [phone]. Fax:[phone]                                                                             Pre- Procedure Progress Note      HPI:  44 year old AA female with known CAD with known CAD s/p 5 stents last year in [place], gastric bypass, HTN, nicotine dependence, pericarditis  admitted 06/23/2023 with chest pain and found to have anterior STEMI. s/p cath (06/23/2023) revealed 90 % occlusion of LAD s/p 1 PCI, Restenosis of RCA and here for staged PCI of RCA.  s/p echo (06/24/2023) EF 45 - 50%, The mid anteroseptal segment and basal inferoseptal segment are hypokinetic. Mild MR. Mild TR.         Symptoms:        Angina (Class):        Ischemic Symptoms:     Heart Failure:        Systolic/Diastolic/Combined:        NYHA Class (within 2 weeks):     Assessment of LVEF (Must be within 6 months):       EF: 45-50%       Assessed by: TTE        Date: 6/24/23    Prior Cardiac Interventions:       PCI's (Date, Stents, Vessels):   6/23/23  Conclusions:   Acute anterior wall MI   EF 50% with diaphragmatic hypokinesis presumably from her previous MI   90% thrombotic stenosis of the prozimal LAD, with IVUS showing the  previous stent to have been undersized.  This area was  successfully restented.  Post IVUS showed optimal results   Normal LCX   Normal Ramus   In stent restenosis of the distal stent edge of the mid RCA and distal  RCA.  Recommendations:   Aspirin and Plavix   Patient to return in 2-3 days for staged PCI of the RCA.   Acute complication:    No complications, No complications     Coronary Angiography   The coronary circulation is right dominant.      LM   Left main artery: The segment is visually normal in size and  structure.    LAD   Proximal left anterior descending: IVUS showed the previous stents to  be undersized.. There is a 90 % stenosis. The previously  placed stent is a drug-eluting stent and is partially occluded. There  is in-stent thrombosis.    CX   Circumflex: The segment is visually normal in size and structure.      RCA   Proximal right coronary artery: The previously placed stent is a  drug-eluting stent and is patent. Mid right coronary artery: There  is a 70 % stenosis. Distal right coronary artery: There is an 80 %  stenosis. The previously placed stent is a drug-eluting stent  and is patent.      Ramus   Ramus intermedius: The segment is visually normal in size and  structure.         CABG (Date, Grafts):     Noninvasive Testing:   Stress Test: Date:        Protocol:        Duration of Exercise:        Symptoms:        EKG Changes:        DTS:        Myocardial Imaging:        Risk Assessment (Low, Medium, High):     Echo (Date, Findings): 6/24/23  Summary:   1. Left ventricular ejection fraction, by visual estimation, is 45 to   50%.   2. Mildly decreased segmental left ventricular systolic function.   3. Mid anteroseptal segment and basal inferoseptal segment are abnormal   as described above.   4. Spectral Doppler shows pseudonormal pattern of left ventricular   myocardial filling (Grade II diastolic dysfunction).   5. Normal right ventricular size and function.   6. Mild mitral valve regurgitation.   7. Mild tricuspid regurgitation.    Additional Diagnostics:      Associated Risk Factors:        Cerebrovascular Disease: N/A       Chronic Lung Disease: N/A       Peripheral Arterial Disease: N/A       Chronic Kidney Disease (if yes, what is GFR): N/A       Uncontrolled Diabetes (if yes, what is HgbA1C or FBS): N/A       Poorly Controlled Hypertension (if yes, what is SBP): N/A       Morbid Obesity (if yes, what is BMI): N/A       History of Recent Ventricular Arrhythmia: N/A       Inability to Ambulate Safely: N/A       Need for Therapeutic Anticoagulation: N/A       Antiplatelet or Contrast Allergy: N/A       Fraility: Mild/Moderate/Severe    Antianginal Therapies:        Beta Blockers:         Calcium Channel Blockers:        Long Acting Nitrates:        Ranexa:     	  MEDICATIONS:  lisinopril 5 milliGRAM(s) Oral daily  metoprolol succinate ER 50 milliGRAM(s) Oral daily  nitroglycerin     SubLingual 0.4 milliGRAM(s) SubLingual Once        acetaminophen     Tablet .. 650 milliGRAM(s) Oral every 6 hours PRN  buPROPion XL (24-Hour) . 150 milliGRAM(s) Oral daily  citalopram 20 milliGRAM(s) Oral daily    pantoprazole    Tablet 40 milliGRAM(s) Oral before breakfast    atorvastatin 40 milliGRAM(s) Oral at bedtime    aspirin enteric coated 81 milliGRAM(s) Oral daily  clopidogrel Tablet 75 milliGRAM(s) Oral daily  heparin   Injectable 5000 Unit(s) SubCutaneous every 12 hours  sodium chloride 0.9% lock flush 3 milliLiter(s) IV Push every 8 hours        PHYSICAL EXAM:    Constitutional: A & O x 3  HEENT:   Normal oral mucosa, PERRL, EOMI	  Cardiovascular: Normal S1 S2, No JVD, No murmurs, No edema  Respiratory: Lungs clear to auscultation	  Gastrointestinal:  Soft, Non-tender, + BS	  Skin: No rashes, No ecchymoses, No cyanosis  Neurologic: Non-focal  Extremities: Normal range of motion, No clubbing, cyanosis or edema  Vascular: Peripheral pulses palpable 2+ bilaterally  Right groin soft, no hematoma dry and intact    T(C): 37.1 (06-26-23 @ 16:03), Max: 37.1 (06-26-23 @ 16:03)  HR: 69 (06-26-23 @ 16:03) (59 - 71)  BP: 143/86 (06-26-23 @ 16:03) (105/64 - 143/86)  RR: 17 (06-26-23 @ 16:03) (17 - 20)  SpO2: 97% (06-26-23 @ 16:03) (97% - 99%)  Wt(kg): --      I&O's Summary      Daily     Daily     TELEMETRY: SB	      ECG:  SR 	    LABS:	 	    CARDIAC MARKERS:                     11.7   6.28  )-----------( 319      ( 26 Jun 2023 03:02 )             36.1     06-26    134<L>  |  100  |  18.4  ----------------------------<  83  4.4   |  22.0  |  0.98    Ca    8.9      26 Jun 2023 03:02  Phos  3.5     06-25  Mg     1.9     06-26    TPro  6.8  /  Alb  3.3  /  TBili  0.3<L>  /  DBili  x   /  AST  18  /  ALT  13  /  AlkPhos  33<L>  06-25    proBNP:   Lipid Profile:   HgA1c:   TSH:         
Hospitalist Progress Note    Chief Complaint:  STEMI    SUBJECTIVE / OVERNIGHT EVENTS:   No acute events reported overnight.  Pt offers no acute complaints at this time.     MEDICATIONS  (STANDING):  aspirin enteric coated 81 milliGRAM(s) Oral daily  atorvastatin 40 milliGRAM(s) Oral at bedtime  buPROPion XL (24-Hour) . 150 milliGRAM(s) Oral daily  citalopram 20 milliGRAM(s) Oral daily  clopidogrel Tablet 75 milliGRAM(s) Oral daily  heparin   Injectable 5000 Unit(s) SubCutaneous every 12 hours  lisinopril 5 milliGRAM(s) Oral daily  metoprolol succinate ER 50 milliGRAM(s) Oral daily  nitroglycerin     SubLingual 0.4 milliGRAM(s) SubLingual Once  pantoprazole    Tablet 40 milliGRAM(s) Oral before breakfast  sodium chloride 0.9% lock flush 3 milliLiter(s) IV Push every 8 hours    MEDICATIONS  (PRN):  acetaminophen     Tablet .. 650 milliGRAM(s) Oral every 6 hours PRN Temp greater or equal to 38C (100.4F), Mild Pain (1 - 3)        I&O's Summary      PHYSICAL EXAM:  Vital Signs Last 24 Hrs  T(C): 37 (29 Jun 2023 10:28), Max: 37 (29 Jun 2023 10:28)  T(F): 98.6 (29 Jun 2023 10:28), Max: 98.6 (29 Jun 2023 10:28)  HR: 66 (29 Jun 2023 10:28) (63 - 74)  BP: 114/74 (29 Jun 2023 10:28) (110/73 - 128/73)  BP(mean): --  RR: 18 (29 Jun 2023 10:28) (18 - 18)  SpO2: 98% (29 Jun 2023 10:28) (95% - 98%)    Parameters below as of 29 Jun 2023 10:28  Patient On (Oxygen Delivery Method): room air          GENEARL: pt examined bedside, laying comfortably in bed in NAD  HEENT: NC/AT, moist oral mucosa, clear conjunctiva, sclera nonicteric  RESPIRATORY: Normal respiratory effort, no wheezing, rhonchi, rales  CARDIOVASCULAR: RRR, normal S1 and S2  ABDOMEN: soft, obsese, NT/ND, normoactive bowel sounds, no rebound/guarding  EXTREMITIES: No cynaosis, no clubbing, no lower extremity edema  NEUROLOGY: A+O to person, place, and time, no focal neurologic deficits appreciated   SKIN: No rashes or no palpable lesions    LABS:                    CAPILLARY BLOOD GLUCOSE            RADIOLOGY & ADDITIONAL TESTS:  Results Reviewed: Y  Imaging Personally Reviewed: N  Electrocardiogram Personally Reviewed: BHUMI                                          
INTERVAL HISTORY:  s/p LHC with PCI yesterday via LFA   S/p LHC with PCI 6/23 for STEMI anterior wall via RFA  	  MEDICATIONS:  lisinopril 5 milliGRAM(s) Oral daily  metoprolol succinate ER 50 milliGRAM(s) Oral daily  nitroglycerin     SubLingual 0.4 milliGRAM(s) SubLingual Once  acetaminophen     Tablet .. 650 milliGRAM(s) Oral every 6 hours PRN  buPROPion XL (24-Hour) . 150 milliGRAM(s) Oral daily  citalopram 20 milliGRAM(s) Oral daily  pantoprazole    Tablet 40 milliGRAM(s) Oral before breakfast  atorvastatin 40 milliGRAM(s) Oral at bedtime  aspirin enteric coated 81 milliGRAM(s) Oral daily  clopidogrel Tablet 75 milliGRAM(s) Oral daily  heparin   Injectable 5000 Unit(s) SubCutaneous every 12 hours  sodium chloride 0.9% lock flush 3 milliLiter(s) IV Push every 8 hours        PHYSICAL EXAM:    T(C): 36.7 (06-27-23 @ 10:30), Max: 37.1 (06-26-23 @ 16:03)  HR: 74 (06-27-23 @ 10:30) (56 - 113)  BP: 109/67 (06-27-23 @ 10:30) (109/57 - 156/77)  RR: 18 (06-26-23 @ 20:55) (16 - 18)  SpO2: 98% (06-27-23 @ 10:30) (93% - 100%)  Wt(kg): --    I&O's Summary      Daily     Daily     Appearance: Normal	  HEENT:   Normal oral mucosa, PERRL, EOMI	  Cardiovascular: Normal S1 S2, No JVD, No murmurs, No edema  Respiratory: Lungs clear to auscultation	  Psychiatry: A & O x 3, Mood & affect appropriate  Gastrointestinal:  Soft, Non-tender, + BS	  Skin: No rashes, No ecchymoses, No cyanosis  Neurologic: Non-focal  Extremities: Normal range of motion, No clubbing, cyanosis or edema  Vascular: Peripheral pulses palpable bilaterally  Procedure Site: R & L groin sites, no bleeding, no hematoma       TELEMETRY: NSR	        LABS:	 	    CARDIAC MARKERS:                            12.0   6.43  )-----------( 313      ( 27 Jun 2023 05:08 )             37.6     06-27    134<L>  |  102  |  20.3<H>  ----------------------------<  88  4.7   |  20.0<L>  |  0.84    Ca    8.9      27 Jun 2023 05:08  Mg     1.9     06-26        ASSESSMENT:  44 year old AA female with known CAD, s/p 5 stents last year in [place], gastric bypass, HTN, nicotine dependence, pericarditis  admitted 06/23/2023 with chest pain and found to have anterior STEMI. s/p cath (06/23/2023) revealed   90% occlusion of LAD s/p 1 PCI, Restenosis of RCA and here for staged PCI of RCA.  s/p echo (06/24/2023) EF 45 - 50%, The mid anteroseptal segment and basal inferoseptal segment are hypokinetic. Mild MR. Mild TR.   Patient  s/p left heart catheterization with intervention via Left femoral artery approach  06/26/23 for RCA stenosis, PCI with YASSINE x3 to RCA.   B/L groin sites remain benign, no bleeding, no hematoma.    PLAN:  S/p STEMI anterior wall 6/23 1 YASSINE LAD via RFA  S/p LHC PCI RCA 3 YASSINE RCA via LFA  Pt remains cardiac stable for discharge home  Continue ASA and Plavix daily  Continue statin   Follow up in office next week    
S: Patient denies chest pain or dyspnea. She denies groin symptoms. Patient transferred to tele floor    TELEMETRY: sr    MEDICATIONS  (STANDING):  aspirin enteric coated 81 milliGRAM(s) Oral daily  atorvastatin 40 milliGRAM(s) Oral at bedtime  buPROPion XL (24-Hour) . 150 milliGRAM(s) Oral daily  citalopram 20 milliGRAM(s) Oral daily  clopidogrel Tablet 75 milliGRAM(s) Oral daily  heparin   Injectable 5000 Unit(s) SubCutaneous every 12 hours  lisinopril 5 milliGRAM(s) Oral daily  metoprolol succinate ER 50 milliGRAM(s) Oral daily  nitroglycerin     SubLingual 0.4 milliGRAM(s) SubLingual Once  pantoprazole    Tablet 40 milliGRAM(s) Oral before breakfast  sodium chloride 0.9% lock flush 3 milliLiter(s) IV Push every 8 hours    MEDICATIONS  (PRN):  acetaminophen     Tablet .. 650 milliGRAM(s) Oral every 6 hours PRN Temp greater or equal to 38C (100.4F), Mild Pain (1 - 3)        Vital Signs Last 24 Hrs  T(C): 36.9 (25 Jun 2023 11:12), Max: 36.9 (25 Jun 2023 11:12)  T(F): 98.4 (25 Jun 2023 11:12), Max: 98.4 (25 Jun 2023 11:12)  HR: 78 (25 Jun 2023 11:12) (68 - 87)  BP: 113/75 (25 Jun 2023 11:12) (106/70 - 122/73)  BP(mean): 86 (24 Jun 2023 15:49) (86 - 86)  RR: 19 (25 Jun 2023 11:12) (18 - 27)  SpO2: 98% (25 Jun 2023 11:12) (97% - 100%)    Parameters below as of 25 Jun 2023 11:12  Patient On (Oxygen Delivery Method): room air        Daily     Daily     I&O's Detail    24 Jun 2023 07:01  -  25 Jun 2023 07:00  --------------------------------------------------------  IN:    Oral Fluid: 360 mL  Total IN: 360 mL    OUT:    Voided (mL): 400 mL  Total OUT: 400 mL    Total NET: -40 mL          PHYSICAL EXAM:  Appearance: In NAD  Neck: No JVD,   Cardiovascular: Normal S1 S2  Respiratory: Lungs clear to auscultation	  Gastrointestinal:  Soft, Non-tender, + BS, no bruits	  Neurologic: Grossly non-focal.  Extremities: No edema    LABS:                        11.6   6.21  )-----------( 327      ( 25 Jun 2023 06:44 )             35.5     06-25    136  |  103  |  14.0  ----------------------------<  93  4.5   |  24.0  |  0.87    Ca    8.8      25 Jun 2023 06:44  Phos  3.5     06-25  Mg     2.0     06-25    TPro  6.8  /  Alb  3.3  /  TBili  0.3<L>  /  DBili  x   /  AST  18  /  ALT  13  /  AlkPhos  33<L>  06-25    CARDIAC MARKERS ( 23 Jun 2023 21:44 )  x     / <0.01 ng/mL / 309 U/L / x     / 4.5 ng/mL        Urinalysis Basic - ( 25 Jun 2023 06:44 )    Color: x / Appearance: x / SG: x / pH: x  Gluc: 93 mg/dL / Ketone: x  / Bili: x / Urobili: x   Blood: x / Protein: x / Nitrite: x   Leuk Esterase: x / RBC: x / WBC x   Sq Epi: x / Non Sq Epi: x / Bacteria: x      I&O's Summary    24 Jun 2023 07:01  -  25 Jun 2023 07:00  --------------------------------------------------------  IN: 360 mL / OUT: 400 mL / NET: -40 mL      EKG (06/24/2023) NSR, RSR', PRWP    
SUBJECTIVE / OVERNIGHT EVENTS: No acute events reported overnight.  Pt offers no acute complaints at this time.         I&O's Summary    23 Jun 2023 07:01  -  24 Jun 2023 07:00  --------------------------------------------------------  IN: 100 mL / OUT: 600 mL / NET: -500 mL    24 Jun 2023 07:01  -  24 Jun 2023 17:38  --------------------------------------------------------  IN: 360 mL / OUT: 400 mL / NET: -40 mL          PHYSICAL EXAM:  Vital Signs Last 24 Hrs  T(C): 36.7 (25 Jun 2023 04:59), Max: 36.8 (24 Jun 2023 11:12)  T(F): 98 (25 Jun 2023 04:59), Max: 98.3 (24 Jun 2023 11:12)  HR: 80 (25 Jun 2023 04:59) (68 - 87)  BP: 121/89 (25 Jun 2023 04:59) (106/70 - 137/58)  BP(mean): 86 (24 Jun 2023 15:49) (80 - 102)  RR: 18 (25 Jun 2023 04:59) (13 - 27)  SpO2: 98% (25 Jun 2023 04:59) (97% - 100%)    Parameters below as of 25 Jun 2023 04:59  Patient On (Oxygen Delivery Method): room air      GENEARL: pt examined bedside, laying comfortably in bed in NAD  HEENT: NC/AT, moist oral mucosa, clear conjunctiva, sclera nonicteric  RESPIRATORY: Normal respiratory effort, no wheezing, rhonchi, rales  CARDIOVASCULAR: RRR, normal S1 and S2  ABDOMEN: soft, obsese, NT/ND, normoactive bowel sounds, no rebound/guarding  EXTREMITIES: No cynaosis, no clubbing, no lower extremity edema  NEUROLOGY: A+O to person, place, and time, no focal neurologic deficits appreciated   SKIN: No rashes or no palpable lesions        LABS:                                          11.6   6.21  )-----------( 327      ( 25 Jun 2023 06:44 )             35.5       06-25    136  |  103  |  14.0  ----------------------------<  93  4.5   |  24.0  |  0.87    Ca    8.8      25 Jun 2023 06:44  Phos  3.5     06-25  Mg     2.0     06-25    TPro  6.8  /  Alb  3.3  /  TBili  0.3<L>  /  DBili  x   /  AST  18  /  ALT  13  /  AlkPhos  33<L>  06-25              Urinalysis Basic - ( 23 Jun 2023 21:44 )    Color: x / Appearance: x / SG: x / pH: x  Gluc: 110 mg/dL / Ketone: x  / Bili: x / Urobili: x   Blood: x / Protein: x / Nitrite: x   Leuk Esterase: x / RBC: x / WBC x   Sq Epi: x / Non Sq Epi: x / Bacteria: x        CAPILLARY BLOOD GLUCOSE        RADIOLOGY & ADDITIONAL TESTS:    < from: Xray Chest 1 View- PORTABLE-Urgent (06.23.23 @ 22:18) >  IMPRESSION:  No active parenchymal disease in the chest.    < end of copied text >    < from: TTE Echo Complete w/o Contrast w/ Doppler (06.24.23 @ 11:19) >  Summary:   1. Left ventricular ejection fraction, by visual estimation, is 45 to   50%.   2. Mildly decreased segmental left ventricular systolic function.   3. Mid anteroseptal segment and basal inferoseptal segment are abnormal   as described above.   4. Spectral Doppler shows pseudonormal pattern of left ventricular   myocardial filling (Grade II diastolic dysfunction).   5. Normal right ventricular size and function.   6. Mild mitral valve regurgitation.   7. Mild tricuspid regurgitation.    < end of copied text >      MEDICATIONS  (STANDING):  aspirin enteric coated 81 milliGRAM(s) Oral daily  atorvastatin 40 milliGRAM(s) Oral at bedtime  buPROPion XL (24-Hour) . 150 milliGRAM(s) Oral daily  citalopram 20 milliGRAM(s) Oral daily  clopidogrel Tablet 75 milliGRAM(s) Oral daily  lisinopril 5 milliGRAM(s) Oral daily  metoprolol succinate ER 50 milliGRAM(s) Oral daily  nitroglycerin     SubLingual 0.4 milliGRAM(s) SubLingual Once  pantoprazole    Tablet 40 milliGRAM(s) Oral before breakfast  sodium chloride 0.9% lock flush 3 milliLiter(s) IV Push every 8 hours    MEDICATIONS  (PRN):  acetaminophen     Tablet .. 650 milliGRAM(s) Oral every 6 hours PRN Temp greater or equal to 38C (100.4F), Mild Pain (1 - 3)                                  
                                                                         Department of Cardiology                                                                  [facility]/[place] [address] [address] [id number]                                                            Telephone: [phone]. Fax:[phone]                                                    Post- Procedure Note: Left Heart Cardiac Catheterization       Narrative:   Patient  now s/p left heart catheterization via Left femoral artery (# 6F sheath, s/p angio seal)   approach with Dr. Modi, tolerated procedure well without complications. Arrived to recovery room NAD and hemodynamically stable, distal pulse +, neurovascular intact          PAST MEDICAL & SURGICAL HISTORY:  CAD (coronary artery disease)  asthma      H/O pericarditis      Nicotine dependence      HTN (hypertension)      Obesity      H/O gastric bypass        Home Medications:  Albuterol (Eqv-ProAir HFA) 90 mcg/inh inhalation aerosol: 2 puff(s) inhaled every 4 hours, As Needed - for shortness of breath and/or wheezing (10 Kang 2022 13:06)  Aspir 81 oral delayed release tablet: 1 tab(s) orally once a day (10 Kang 2022 13:06)  atorvastatin 40 mg oral tablet: 1 tab(s) orally once a day (at bedtime) (10 Kang 2022 13:06)  citalopram 20 mg oral tablet: 1 tab(s) orally once a day (10 Kang 2022 13:06)  doxepin 50 mg oral capsule: 1 cap(s) orally once a day (at bedtime) (10 Kang 2022 13:06)  metoprolol succinate 25 mg oral tablet, extended release: 1 tab(s) orally 2 times a day (10 Kang 2022 13:06)  prasugrel 10 mg oral tablet: 1 tab(s) orally once a day (10 Kang 2022 13:06)  Wellbutrin  mg/24 hours oral tablet, extended release: 1 tab(s) orally every 24 hours (10 Kang 2022 13:06)        [place] (Anaphylaxis)  penicillin (Unknown)      Objective:  Vital Signs Last 24 Hrs  T(C): 36.7 (26 Jun 2023 17:10), Max: 37.1 (26 Jun 2023 16:03)  T(F): 98.1 (26 Jun 2023 17:10), Max: 98.7 (26 Jun 2023 16:03)  HR: 113 (26 Jun 2023 17:10) (59 - 113)  BP: 125/74 (26 Jun 2023 17:10) (105/64 - 143/86)  BP(mean): --  RR: 17 (26 Jun 2023 17:10) (17 - 20)  SpO2: 93% (26 Jun 2023 17:10) (93% - 99%)    Parameters below as of 26 Jun 2023 17:10  Patient On (Oxygen Delivery Method): room air        GENERAL: Pt lying comfortably, NAD.  ENMT: PERRL, +EOMI.  NECK: soft, Supple, No JVD,   CHEST/LUNG: Clear to auscultatation bilaterally; No wheezing.  HEART: S1S2+, Regular rate and rhythm; No murmurs.  ABDOMEN: Soft, Nontender, Nondistended; Bowel sounds present.  MUSCULOSKELETAL: Normal range of motion.  SKIN: No rashes or lesions.  NEURO: AAOX3, no focal deficits, no motor r sensory loss.  PSYCH: normal mood.  Procedure site: LFA  no signs of bleeding or hematoma, neurovascular intact   VASCULAR:   Radial +2 R/+2 L  Femoral +2 R/+2 L  PT +2 R/+2 L  DP +2 R/+2 L                          11.7   6.28  )-----------( 319      ( 26 Jun 2023 03:02 )             36.1     06-26    134<L>  |  100  |  18.4  ----------------------------<  83  4.4   |  22.0  |  0.98    Ca    8.9      26 Jun 2023 03:02  Phos  3.5     06-25  Mg     1.9     06-26    TPro  6.8  /  Alb  3.3  /  TBili  0.3<L>  /  DBili  x   /  AST  18  /  ALT  13  /  AlkPhos  33<L>  06-25        
Hospitalist Progress Note    Chief Complaint:  STEMI    SUBJECTIVE / OVERNIGHT EVENTS:   No acute events reported overnight.  Pt offers no acute complaints at this time.     MEDICATIONS  (STANDING):  aspirin enteric coated 81 milliGRAM(s) Oral daily  atorvastatin 40 milliGRAM(s) Oral at bedtime  buPROPion XL (24-Hour) . 150 milliGRAM(s) Oral daily  citalopram 20 milliGRAM(s) Oral daily  clopidogrel Tablet 75 milliGRAM(s) Oral daily  heparin   Injectable 5000 Unit(s) SubCutaneous every 12 hours  lisinopril 5 milliGRAM(s) Oral daily  metoprolol succinate ER 50 milliGRAM(s) Oral daily  nitroglycerin     SubLingual 0.4 milliGRAM(s) SubLingual Once  pantoprazole    Tablet 40 milliGRAM(s) Oral before breakfast  sodium chloride 0.9% lock flush 3 milliLiter(s) IV Push every 8 hours    MEDICATIONS  (PRN):  acetaminophen     Tablet .. 650 milliGRAM(s) Oral every 6 hours PRN Temp greater or equal to 38C (100.4F), Mild Pain (1 - 3)        I&O's Summary      PHYSICAL EXAM:  Vital Signs Last 24 Hrs  T(C): 36.7 (28 Jun 2023 05:07), Max: 37 (27 Jun 2023 17:10)  T(F): 98.1 (28 Jun 2023 05:07), Max: 98.6 (27 Jun 2023 17:10)  HR: 62 (28 Jun 2023 05:07) (61 - 74)  BP: 120/84 (28 Jun 2023 05:07) (99/69 - 120/84)  BP(mean): --  RR: 18 (28 Jun 2023 05:07) (18 - 18)  SpO2: 98% (28 Jun 2023 05:07) (95% - 98%)    Parameters below as of 27 Jun 2023 20:00  Patient On (Oxygen Delivery Method): room air            GENEARL: pt examined bedside, laying comfortably in bed in NAD  HEENT: NC/AT, moist oral mucosa, clear conjunctiva, sclera nonicteric  RESPIRATORY: Normal respiratory effort, no wheezing, rhonchi, rales  CARDIOVASCULAR: RRR, normal S1 and S2  ABDOMEN: soft, obsese, NT/ND, normoactive bowel sounds, no rebound/guarding  EXTREMITIES: No cynaosis, no clubbing, no lower extremity edema  NEUROLOGY: A+O to person, place, and time, no focal neurologic deficits appreciated   SKIN: No rashes or no palpable lesions    LABS:                        12.0   6.43  )-----------( 313      ( 27 Jun 2023 05:08 )             37.6     06-27    134<L>  |  102  |  20.3<H>  ----------------------------<  88  4.7   |  20.0<L>  |  0.84    Ca    8.9      27 Jun 2023 05:08            Urinalysis Basic - ( 27 Jun 2023 05:08 )    Color: x / Appearance: x / SG: x / pH: x  Gluc: 88 mg/dL / Ketone: x  / Bili: x / Urobili: x   Blood: x / Protein: x / Nitrite: x   Leuk Esterase: x / RBC: x / WBC x   Sq Epi: x / Non Sq Epi: x / Bacteria: x        CAPILLARY BLOOD GLUCOSE            RADIOLOGY & ADDITIONAL TESTS:  Results Reviewed: Y  Imaging Personally Reviewed: N  Electrocardiogram Personally Reviewed: N                                          
INTERVAL HISTORY:  No recurrent chest pain  	  MEDICATIONS:  lisinopril 5 milliGRAM(s) Oral daily  metoprolol succinate ER 50 milliGRAM(s) Oral daily  nitroglycerin     SubLingual 0.4 milliGRAM(s) SubLingual Once        acetaminophen     Tablet .. 650 milliGRAM(s) Oral every 6 hours PRN  buPROPion XL (24-Hour) . 150 milliGRAM(s) Oral daily  citalopram 20 milliGRAM(s) Oral daily    pantoprazole    Tablet 40 milliGRAM(s) Oral before breakfast    atorvastatin 40 milliGRAM(s) Oral at bedtime    aspirin enteric coated 81 milliGRAM(s) Oral daily  clopidogrel Tablet 75 milliGRAM(s) Oral daily  heparin   Injectable 5000 Unit(s) SubCutaneous every 12 hours  sodium chloride 0.9% lock flush 3 milliLiter(s) IV Push every 8 hours        PHYSICAL EXAM:    T(C): 36.7 (06-26-23 @ 17:10), Max: 37.1 (06-26-23 @ 16:03)  HR: 56 (06-26-23 @ 20:00) (56 - 113)  BP: 133/68 (06-26-23 @ 20:00) (105/64 - 156/77)  RR: 16 (06-26-23 @ 20:00) (16 - 20)  SpO2: 100% (06-26-23 @ 20:00) (93% - 100%)  Wt(kg): --    I&O's Summary      Daily     Daily     Appearance: Normal	  HEENT:   Normal oral mucosa, PERRL, EOMI	  Cardiovascular: Normal S1 S2, No JVD, No murmurs, No edema  Respiratory: Lungs clear to auscultation	  Psychiatry: A & O x 3, Mood & affect appropriate  Gastrointestinal:  Soft, Non-tender, + BS	  Skin: No rashes, No ecchymoses, No cyanosis  Neurologic: Non-focal  Extremities: Normal range of motion, No clubbing, cyanosis or edema  Vascular: Peripheral pulses palpable 2+ bilaterally                            11.7   6.28  )-----------( 319      ( 26 Jun 2023 03:02 )             36.1     06-26    134<L>  |  100  |  18.4  ----------------------------<  83  4.4   |  22.0  |  0.98    Ca    8.9      26 Jun 2023 03:02  Phos  3.5     06-25  Mg     1.9     06-26    TPro  6.8  /  Alb  3.3  /  TBili  0.3<L>  /  DBili  x   /  AST  18  /  ALT  13  /  AlkPhos  33<L>  06-25    proBNP:   Lipid Profile:   HgA1c:     ASSESSMENT/PLAN: 	     47yo F w/ PMHx CAD s/p 5 stents last year in [place], gastric bypass, HTN, nicotine dependence, pericarditis, who presents from home w/ acute onset of chest pain, left sided, sharp and 9/10 in severity.  Radiating to left arm and left neck, associated with nausea, diaphoresis, dyspnea, dizziness, lasting 30min, worse with laying flat.  Denies fever, chills, recent illness, cough or back pain.   ECG showed Sinus Rhythm STEMI Anterior leads    Patient found to have a thrombotic stenosis of the proximal LAD which was stented  Has in stent restenosis of the RCA  For PCI today.
S: Patient resting in NAD     TELEMETRY: sr    MEDICATIONS  (STANDING):  aspirin enteric coated 81 milliGRAM(s) Oral daily  atorvastatin 40 milliGRAM(s) Oral at bedtime  buPROPion XL (24-Hour) . 150 milliGRAM(s) Oral daily  citalopram 20 milliGRAM(s) Oral daily  clopidogrel Tablet 75 milliGRAM(s) Oral daily  lisinopril 5 milliGRAM(s) Oral daily  metoprolol succinate ER 50 milliGRAM(s) Oral daily  nitroglycerin     SubLingual 0.4 milliGRAM(s) SubLingual Once  pantoprazole    Tablet 40 milliGRAM(s) Oral before breakfast  sodium chloride 0.9% lock flush 3 milliLiter(s) IV Push every 8 hours    MEDICATIONS  (PRN):  acetaminophen     Tablet .. 650 milliGRAM(s) Oral every 6 hours PRN Temp greater or equal to 38C (100.4F), Mild Pain (1 - 3)        Vital Signs Last 24 Hrs  T(C): 36.8 (2023 11:12), Max: 36.8 (2023 11:12)  T(F): 98.3 (2023 11:12), Max: 98.3 (2023 11:12)  HR: 82 (2023 13:00) (61 - 91)  BP: 112/94 (2023 13:00) (107/74 - 155/88)  BP(mean): 102 (2023 13:00) (80 - 112)  RR: 19 (2023 13:00) (9 - 23)  SpO2: 100% (2023 13:00) (95% - 100%)    Parameters below as of 2023 12:00  Patient On (Oxygen Delivery Method): room air        Daily Height in cm: 157.48 (2023 00:00)    Daily Weight in k (2023 00:00)    I&O's Detail    2023 07:01  -  2023 07:00  --------------------------------------------------------  IN:    IV PiggyBack: 100 mL  Total IN: 100 mL    OUT:    Heparin Infusion: 0 mL    Voided (mL): 600 mL  Total OUT: 600 mL    Total NET: -500 mL      2023 07:01  -  2023 13:46  --------------------------------------------------------  IN:    Oral Fluid: 360 mL  Total IN: 360 mL    OUT:    Voided (mL): 400 mL  Total OUT: 400 mL    Total NET: -40 mL          PHYSICAL EXAM:  Appearance: In NAD  Neck: No JVD,   Cardiovascular: Normal S1 S2  Respiratory: Lungs clear to auscultation	  Gastrointestinal:  Soft, Non-tender, + BS, no bruits	  Neurologic: Grossly non-focal.  Extremities: No edema    LABS:                        11.4   8.55  )-----------( 311      ( 2023 02:55 )             33.9     06-24    133<L>  |  101  |  x   ----------------------------<  x   4.7   |  21.0<L>  |  x     Ca    9.9      2023 21:44  Mg     2.1     06-    TPro  7.7  /  Alb  4.3  /  TBili  0.4  /  DBili  x   /  AST  25  /  ALT  18  /  AlkPhos  40  06-23    CARDIAC MARKERS ( 2023 21:44 )  x     / <0.01 ng/mL / 309 U/L / x     / 4.5 ng/mL        Urinalysis Basic - ( 2023 21:44 )    Color: x / Appearance: x / SG: x / pH: x  Gluc: 110 mg/dL / Ketone: x  / Bili: x / Urobili: x   Blood: x / Protein: x / Nitrite: x   Leuk Esterase: x / RBC: x / WBC x   Sq Epi: x / Non Sq Epi: x / Bacteria: x      I&O's Summary    2023 07:01  -  2023 07:00  --------------------------------------------------------  IN: 100 mL / OUT: 600 mL / NET: -500 mL    2023 07:01  -  2023 13:46  --------------------------------------------------------  IN: 360 mL / OUT: 400 mL / NET: -40 mL    
Hospitalist Progress Note    Chief Complaint:  STEMI    SUBJECTIVE / OVERNIGHT EVENTS:   No acute events reported overnight.  Pt offers no acute complaints at this time.       MEDICATIONS  (STANDING):  aspirin enteric coated 81 milliGRAM(s) Oral daily  atorvastatin 40 milliGRAM(s) Oral at bedtime  buPROPion XL (24-Hour) . 150 milliGRAM(s) Oral daily  citalopram 20 milliGRAM(s) Oral daily  clopidogrel Tablet 75 milliGRAM(s) Oral daily  heparin   Injectable 5000 Unit(s) SubCutaneous every 12 hours  lisinopril 5 milliGRAM(s) Oral daily  metoprolol succinate ER 50 milliGRAM(s) Oral daily  nitroglycerin     SubLingual 0.4 milliGRAM(s) SubLingual Once  pantoprazole    Tablet 40 milliGRAM(s) Oral before breakfast  sodium chloride 0.9% Bolus 250 milliLiter(s) IV Bolus once  sodium chloride 0.9% lock flush 3 milliLiter(s) IV Push every 8 hours    MEDICATIONS  (PRN):  acetaminophen     Tablet .. 650 milliGRAM(s) Oral every 6 hours PRN Temp greater or equal to 38C (100.4F), Mild Pain (1 - 3)        I&O's Summary      PHYSICAL EXAM:  Vital Signs Last 24 Hrs  T(C): 36.7 (26 Jun 2023 17:10), Max: 37.1 (26 Jun 2023 16:03)  T(F): 98.1 (26 Jun 2023 17:10), Max: 98.7 (26 Jun 2023 16:03)  HR: 113 (26 Jun 2023 17:10) (59 - 113)  BP: 125/74 (26 Jun 2023 17:10) (105/64 - 143/86)  BP(mean): --  RR: 17 (26 Jun 2023 17:10) (17 - 20)  SpO2: 93% (26 Jun 2023 17:10) (93% - 99%)    Parameters below as of 26 Jun 2023 17:10  Patient On (Oxygen Delivery Method): room air          GENEARL: pt examined bedside, laying comfortably in bed in NAD  HEENT: NC/AT, moist oral mucosa, clear conjunctiva, sclera nonicteric  RESPIRATORY: Normal respiratory effort, no wheezing, rhonchi, rales  CARDIOVASCULAR: RRR, normal S1 and S2  ABDOMEN: soft, obsese, NT/ND, normoactive bowel sounds, no rebound/guarding  EXTREMITIES: No cynaosis, no clubbing, no lower extremity edema  NEUROLOGY: A+O to person, place, and time, no focal neurologic deficits appreciated   SKIN: No rashes or no palpable lesions    LABS:                        11.7   6.28  )-----------( 319      ( 26 Jun 2023 03:02 )             36.1     06-26    134<L>  |  100  |  18.4  ----------------------------<  83  4.4   |  22.0  |  0.98    Ca    8.9      26 Jun 2023 03:02  Phos  3.5     06-25  Mg     1.9     06-26    TPro  6.8  /  Alb  3.3  /  TBili  0.3<L>  /  DBili  x   /  AST  18  /  ALT  13  /  AlkPhos  33<L>  06-25          Urinalysis Basic - ( 26 Jun 2023 03:02 )    Color: x / Appearance: x / SG: x / pH: x  Gluc: 83 mg/dL / Ketone: x  / Bili: x / Urobili: x   Blood: x / Protein: x / Nitrite: x   Leuk Esterase: x / RBC: x / WBC x   Sq Epi: x / Non Sq Epi: x / Bacteria: x        CAPILLARY BLOOD GLUCOSE            RADIOLOGY & ADDITIONAL TESTS:  Results Reviewed: Y  Imaging Personally Reviewed: N  Electrocardiogram Personally Reviewed: N

## 2023-06-29 NOTE — PROGRESS NOTE ADULT - ASSESSMENT
47 y/o F w/ pMH of CAD s/p PCI 05/2023 w/ 5 stents, HTN, HLD came in for cp and found to have AMI; code stemi called and pt went for urgent cath w/ subsequent YASSINE x1 placed to prox LAD.  pt noted to have stenosis of one of her former stents and will go for staged PCI on Monday        STEMI s/p 1 YASSINE to LAD  - Hx of 5 stents last year   - c/w ASA and plavix  - Utox + for amphetamines but pt is on wellbutrin which can cause false +amphetamine result on utox but also reporting +benzo's which were not given during hospital course   - c/w BB, ACE and statin   - Underwent [facility] w/ PCI, 3  stent placed in RCA  - Monitor on telemetry   - Cardio following and recs noted       HFrEF  - LVEF 45% on TTE   - Clinically euvolemic   - Monitor daily weights and i/o's  - May benefit from SGLT2I prior to d/c       Nicotine dependence  - counseled on abstaining from tobacco use       VTE ppx: heparin sq     Dispo: Awaiting auth for shelter

## 2023-06-29 NOTE — CHART NOTE - NSCHARTNOTEFT_GEN_A_CORE
Patient s/p [facility] with intervention on 06/25/23 via LFA   Evaluated the patient at bedside  patient lying in bed, a&ox4, CP free  Overnight events: none    Vital Signs Last 24 Hrs  T(C): 36.5 (27 Jun 2023 04:55), Max: 37.1 (26 Jun 2023 16:03)  T(F): 97.7 (27 Jun 2023 04:55), Max: 98.7 (26 Jun 2023 16:03)  HR: 64 (27 Jun 2023 04:55) (56 - 113)  BP: 109/57 (27 Jun 2023 04:55) (105/64 - 156/77)  BP(mean): --  RR: 18 (26 Jun 2023 20:55) (16 - 18)  SpO2: 97% (26 Jun 2023 20:55) (93% - 100%)    Parameters below as of 26 Jun 2023 20:55  Patient On (Oxygen Delivery Method): room air                          12.0   6.43  )-----------( 313      ( 27 Jun 2023 05:08 )             37.6         Basic Metabolic Panel in AM (06.27.23 @ 05:08)    Sodium: 134 mmol/L    Potassium: 4.7 mmol/L    Chloride: 102: Chloride reference range changed from ..10/26/2022 mmol/L    Carbon Dioxide: 20.0 mmol/L    Anion Gap: 12 mmol/L    Blood Urea Nitrogen: 20.3 mg/dL    Creatinine: 0.84 mg/dL    Glucose: 88 mg/dL    Calcium: 8.9 mg/dL        A/P    44 year old AA female with known CAD, s/p 5 stents last year in [place], gastric bypass, HTN, nicotine dependence, pericarditis  admitted 06/23/2023 with chest pain and found to have anterior STEMI. s/p cath (06/23/2023) revealed   90% occlusion of LAD s/p 1 PCI, Restenosis of RCA and here for staged PCI of RCA.  s/p echo (06/24/2023) EF 45 - 50%, The mid anteroseptal segment and basal inferoseptal segment are hypokinetic. Mild MR. Mild TR.   Patient  s/p left heart catheterization with intervention via Left femoral artery   approach with Dr. Modi, ON 06/26/23      RCA stenosis, DESX3 to RCA  I  Findings:     < from: Cardiac Catheterization (06.26.23 @ 16:54) >  Diagnostic Findings:     LM   Left main artery: The segment is visually normal in size and  structure.    LAD   Proximal left anterior descending: The previously placed stent is a  drug-eluting stent and is patent. Mid left anterior descending:  The previously placed stent is a drug-eluting stent and is patent.      CX   Circumflex: The segment is visually normal in size and structure.      RCA   Ostial right coronary artery: The stent is undersized.  There is 81%  by area stenosis.. There is an 80 % in-stent restenosis.  Intravascular ultrasound was performed. Imaging shows cross sectional  area of 4.2 mm2. Based on the results, the lesion was  judged to be significant and an intervention was performed. Mid right  coronary artery: The stent is undersized.  There is a 77%  by area stenosis.. There is an 80 % in-stent restenosis. Intravascular  ultrasound was performed. Imaging shows cross  sectional area of 4.9 mm2. Based on the results, the lesion was judged  to be significant and an intervention was performed.  Distal right coronary artery: There is an 80 % in-stent restenosis.  Intravascular ultrasound was performed. Imaging shows cross  sectional area of 3.6 mm2. Based on the results, the lesion was judged  to be significant and an intervention was performed.    Ramus   Ramus intermedius: The segment is visually normal in size and  structure.  Interventional Details   Distal right coronary artery: The initial stenosis was 80 %.     A successful IVUS was performed  A successful Cutting Balloon angioplasty was performed   A successful Drug Eluting Stent was deployed using a ELENA FRONTIER 4.0  X 38MM During Procedure.  A successful Balloon angioplasty was performed    mid right coronary artery: The initial stenosis was 80 %.   A successful Drug Eluting Stent was deployed   A successful Balloon angioplasty was performed   Ostial right coronary artery: The initial stenosis was 80 %. A successful Drug Eluting Stent was deployed  A successful Balloon angioplasty was performed  A successful Balloon angioplasty was performed      # CAD/S/P coronary stents  12 lead EKG reviewed; SR with siinus arrhythmia, No acute ST/T changes  Left groin benign without bleeding/hematoma  NV status intact, distal pulses 2+  C/W DAPT, Statin, BB  -Discharge plan per Cardiology /Hospitalist    Mishel Taylor [facility]  Interventional Cardiology  Available via teams
48y old  Female who presents with a chief complaint of chest pain   HPI: 48 year old female with PMHx CAD s/p 5 stents last year in [place], gastric bypass, HTN, nicotine dependence, pericarditis who presented to ER from home complaining of sudden onset chest pain that radiated towrads the left, associated with nausea, diaphoresis, dyspnea, dizziness and described sharp and 9 out of 10 in severity.  The pain 30min, worse with laying flat.  She denied any other history including  fever, chills, recent illness, cough or back pain.  active chest pain with ST elevations , Code STEMI was called in the ER and she was taken to the cath lab. Post procedure chest pain free and hemodynamically stable      - Code STEMI for anterior wall MI with 90 % occlusion of LAD s/p 1 PCI  - ASA/ Plavix/ BB/ ACEI/ Statin  - Restenosis of RCA, planned LHC stage PCI on monday for RCA lesion  - HD stable, maintain MAP >65. Maintaining o2 sat > 92 % on room air. At baseline mental status. Stable for downgrade to Tele. Signed out to hospitalist Dr batista who accepted patient at 2:15 pm on 06/24/23
[employer] Program Note:    Initial Screening    Date of Initial Screenin23    Patient qualifies for Food [employer] Program  [ xx ] Patient qualifies for [employer] Program w/ health condition  [  ] Patient does not qualify for [employer] As [employer] Program w/ no health conditions    Diagnosis that qualifies patient for program  [ x] Hypertension  [  ] Diabetes  [  ] Unintended weight loss  [ x ] Obesity  [  ] CHF  [  ] Other    Additional Comments:          Current Patient Diet: DASH/TLC Diet      Actions Taken:  [ x ] Spoke with patient  [ x ] [employer] survey completed  Additional Comments: Provided with community resources through [employer], as well as list of local food pantries and SNAP.          Non-qualification for Food As [employer] Program  [   ] D/C to [facility]  [   ] Referred to Social Work  [  ] Declined [facility] [employer] [facility] Program  [   ] D/C Before Seen By RD  Participant Phone Number:  GABI Comments:              Discharge Visit  [  ] Initial Screening already completed    Date of D/C:  [  ]  Patient provided with healthy groceries  [ x ] Follow Up appointment made  [ x ] Other community outreach given  [ x ] Help with SNAP application at F/U appointment  [  ] Patient D/C while RD was unavailable. Will call to schedule pick-up

## 2023-07-05 PROBLEM — E66.9 OBESITY, UNSPECIFIED: Chronic | Status: ACTIVE | Noted: 2023-06-23

## 2023-07-05 PROBLEM — I10 ESSENTIAL (PRIMARY) HYPERTENSION: Chronic | Status: ACTIVE | Noted: 2023-06-23

## 2023-07-05 PROBLEM — F17.200 NICOTINE DEPENDENCE, UNSPECIFIED, UNCOMPLICATED: Chronic | Status: ACTIVE | Noted: 2023-06-23

## 2023-07-05 PROBLEM — Z86.79 PERSONAL HISTORY OF OTHER DISEASES OF THE CIRCULATORY SYSTEM: Chronic | Status: ACTIVE | Noted: 2023-06-23

## 2023-07-10 NOTE — CDI QUERY NOTE - NSCDIOTHERTXTBX_GEN_ALL_CORE_HH
Please specify the acuity of CHF    - Chronic HFrEF  - Other ( please specify)  - Not clinically significant    Documentation    6/23 BNP 84    < from: Xray Chest 1 View- PORTABLE-Urgent (06.23.23 @ 22:18) >    INTERPRETATION:  Exam:XR CHEST URGENT    clinical history:Chest Pain    Heart size is normal. Lungs show no acute infiltrate. Nopleural effusion.    IMPRESSION:  No active parenchymal disease in the chest.    < end of copied text >    6/24 Hosp- HFrEF  - LVEF 45% on TTE   - Clinically euvolemic   - Monitor daily weights and i/o's  - May benefit from SGLT2I prior to d/c     6/29 D/C note-   SECONDARY DISCHARGE DIAGNOSES  Diagnosis: Congestive heart failure  Assessment and Plan of Treatment: - Continue medications as directed  - Follow up with Cardiology in 1 week    < from: TTE Echo Complete w/o Contrast w/ Doppler (06.24.23 @ 11:19) >    Summary:   1. Left ventricular ejection fraction, by visual estimation, is 45 to   50%.   2. Mildly decreased segmental left ventricular systolic function.   3. Mid anteroseptal segment and basal inferoseptal segment are abnormal   as described above.   4. Spectral Doppler shows pseudonormal pattern of left ventricular   myocardial filling (Grade II diastolic dysfunction).   5. Normal right ventricular size and function.   6. Mild mitral valve regurgitation.   7. Mild tricuspid regurgitation.    < end of copied text >

## 2023-07-23 NOTE — H&P CARDIOLOGY - PATIENT REFERRED TO
07/23/23 0900   Team Meeting   Meeting Type Daily Rounds   Team Members Present   Team Members Present Physician;Nurse   Physician Team Member [id number] [facility] [address]   Nursing Team Member [facility]   Patient/Family Present   Patient Present No   Patient's Family Present No     Daily Rounds: Pt states AH are baseline. Naps frequently. Otherwise social with peers.   Denies SI. Peripheral catherization with possible intervention

## 2025-06-11 NOTE — CONSULT NOTE ADULT - CONSULT REASON
Chest pain/STEMI
post cardiac cath
PROVIDER:[TOKEN:[[id number]],FOLLOWUP:[1 week]],PROVIDER:[TOKEN:[[id number]],FOLLOWUP:[1 week]]